# Patient Record
Sex: FEMALE | Race: WHITE | Employment: OTHER | URBAN - METROPOLITAN AREA
[De-identification: names, ages, dates, MRNs, and addresses within clinical notes are randomized per-mention and may not be internally consistent; named-entity substitution may affect disease eponyms.]

---

## 2023-12-15 NOTE — CONFIDENTIAL NOTE
DIAGNOSIS:  Kidney failure   DATE RECEIVED:  01.11.2024    NOTES STATUS DETAILS   OFFICE NOTE from referring provider     OFFICE NOTE from other specialist  Care Everywhere 09.15.2022 Wendy Snow MD  [facility]     *Only VASCULITIS or LUPUS gather office notes for the following     *PULMONARY       *ENT     *DERMATOLOGY     *RHEUMATOLOGY     DISCHARGE SUMMARY from hospital     DISCHARGE REPORT from the ER     MEDICATION LIST Care Everywhere    IMAGING  (NEED IMAGES AND REPORTS)     KIDNEY CT SCAN     KIDNEY ULTRASOUND     MR ABDOMEN     NUCLEAR MEDICINE RENAL     LABS     CBC Care Everywhere 09.21.2023   CMP Care Everywhere 09.21.2023   BMP Care Everywhere 09.28.2023    UA Care Everywhere 09.09.2023   URINE PROTEIN Care Everywhere 09.09.2023   RENAL PANEL Care Everywhere 09.11.2023    BIOPSY     KIDNEY BIOPSY

## 2024-01-02 ENCOUNTER — TELEPHONE (OUTPATIENT)
Dept: NEPHROLOGY | Facility: CLINIC | Age: 59
End: 2024-01-02
Payer: MEDICARE

## 2024-01-02 NOTE — TELEPHONE ENCOUNTER
Called and spoke with pt. Appointment reminder for 1/11 at 1:00 pm person with non-fasting labs at 12:00 pm.  Olivia Ann,  Nephrology Clinic Navigator  [facility]  Direct: [phone].

## 2024-01-04 DIAGNOSIS — R79.89 ELEVATED SERUM CREATININE: Primary | ICD-10-CM

## 2024-01-11 ENCOUNTER — OFFICE VISIT (OUTPATIENT)
Dept: NEPHROLOGY | Facility: CLINIC | Age: 59
End: 2024-01-11
Attending: INTERNAL MEDICINE
Payer: MEDICARE

## 2024-01-11 ENCOUNTER — LAB (OUTPATIENT)
Dept: LAB | Facility: CLINIC | Age: 59
End: 2024-01-11
Payer: MEDICARE

## 2024-01-11 ENCOUNTER — PRE VISIT (OUTPATIENT)
Dept: NEPHROLOGY | Facility: CLINIC | Age: 59
End: 2024-01-11

## 2024-01-11 VITALS
OXYGEN SATURATION: 96 % | SYSTOLIC BLOOD PRESSURE: 145 MMHG | HEART RATE: 81 BPM | DIASTOLIC BLOOD PRESSURE: 85 MMHG | WEIGHT: 198.4 LBS

## 2024-01-11 DIAGNOSIS — D63.1 ANEMIA IN STAGE 4 CHRONIC KIDNEY DISEASE (H): Primary | ICD-10-CM

## 2024-01-11 DIAGNOSIS — I12.9 CKD STAGE 4 SECONDARY TO HYPERTENSION (H): ICD-10-CM

## 2024-01-11 DIAGNOSIS — D63.1 ANEMIA IN STAGE 4 CHRONIC KIDNEY DISEASE (H): ICD-10-CM

## 2024-01-11 DIAGNOSIS — N18.30 DIABETES MELLITUS DUE TO UNDERLYING CONDITION WITH STAGE 3 CHRONIC KIDNEY DISEASE, UNSPECIFIED WHETHER LONG TERM INSULIN USE, UNSPECIFIED WHETHER STAGE 3A OR 3B CKD (H): ICD-10-CM

## 2024-01-11 DIAGNOSIS — E08.22 DIABETES MELLITUS DUE TO UNDERLYING CONDITION WITH STAGE 3 CHRONIC KIDNEY DISEASE, UNSPECIFIED WHETHER LONG TERM INSULIN USE, UNSPECIFIED WHETHER STAGE 3A OR 3B CKD (H): ICD-10-CM

## 2024-01-11 DIAGNOSIS — R79.89 ELEVATED SERUM CREATININE: ICD-10-CM

## 2024-01-11 DIAGNOSIS — N18.4 ANEMIA IN STAGE 4 CHRONIC KIDNEY DISEASE (H): Primary | ICD-10-CM

## 2024-01-11 DIAGNOSIS — N18.4 CKD STAGE 4 SECONDARY TO HYPERTENSION (H): ICD-10-CM

## 2024-01-11 DIAGNOSIS — I10 HYPERTENSION, ESSENTIAL: ICD-10-CM

## 2024-01-11 DIAGNOSIS — N18.4 ANEMIA IN STAGE 4 CHRONIC KIDNEY DISEASE (H): ICD-10-CM

## 2024-01-11 LAB
ALBUMIN MFR UR ELPH: 16.6 MG/DL
ALBUMIN SERPL BCG-MCNC: 4 G/DL (ref 3.5–5.2)
ALBUMIN UR-MCNC: 10 MG/DL
ANION GAP SERPL CALCULATED.3IONS-SCNC: 10 MMOL/L (ref 7–15)
APPEARANCE UR: CLEAR
BILIRUB UR QL STRIP: NEGATIVE
BUN SERPL-MCNC: 28.8 MG/DL (ref 8–23)
CALCIUM SERPL-MCNC: 9.3 MG/DL (ref 8.8–10.2)
CHLORIDE SERPL-SCNC: 104 MMOL/L (ref 98–107)
COLOR UR AUTO: ABNORMAL
CREAT SERPL-MCNC: 2.21 MG/DL (ref 0.51–0.95)
CREAT UR-MCNC: 57.4 MG/DL
DEPRECATED HCO3 PLAS-SCNC: 28 MMOL/L (ref 22–29)
EGFRCR SERPLBLD CKD-EPI 2021: 25 ML/MIN/1.73M2
ERYTHROCYTE [DISTWIDTH] IN BLOOD BY AUTOMATED COUNT: 12.6 % (ref 10–15)
FERRITIN SERPL-MCNC: 83 NG/ML (ref 11–328)
GLUCOSE SERPL-MCNC: 164 MG/DL (ref 70–99)
GLUCOSE UR STRIP-MCNC: NEGATIVE MG/DL
HCT VFR BLD AUTO: 27.4 % (ref 35–47)
HGB BLD-MCNC: 8.7 G/DL (ref 11.7–15.7)
HGB UR QL STRIP: NEGATIVE
IRON BINDING CAPACITY (ROCHE): 221 UG/DL (ref 240–430)
IRON SATN MFR SERPL: 21 % (ref 15–46)
IRON SERPL-MCNC: 46 UG/DL (ref 37–145)
KETONES UR STRIP-MCNC: NEGATIVE MG/DL
LEUKOCYTE ESTERASE UR QL STRIP: NEGATIVE
MCH RBC QN AUTO: 30.5 PG (ref 26.5–33)
MCHC RBC AUTO-ENTMCNC: 31.8 G/DL (ref 31.5–36.5)
MCV RBC AUTO: 96 FL (ref 78–100)
NITRATE UR QL: NEGATIVE
PH UR STRIP: 5 [PH] (ref 5–7)
PHOSPHATE SERPL-MCNC: 4.7 MG/DL (ref 2.5–4.5)
PLATELET # BLD AUTO: 144 10E3/UL (ref 150–450)
POTASSIUM SERPL-SCNC: 4 MMOL/L (ref 3.4–5.3)
PROT/CREAT 24H UR: 0.29 MG/MG CR (ref 0–0.2)
RBC # BLD AUTO: 2.85 10E6/UL (ref 3.8–5.2)
RBC URINE: 2 /HPF
SODIUM SERPL-SCNC: 142 MMOL/L (ref 135–145)
SP GR UR STRIP: 1.01 (ref 1–1.03)
SQUAMOUS EPITHELIAL: 5 /HPF
UROBILINOGEN UR STRIP-MCNC: NORMAL MG/DL
WBC # BLD AUTO: 5.6 10E3/UL (ref 4–11)
WBC URINE: <1 /HPF

## 2024-01-11 PROCEDURE — 82570 ASSAY OF URINE CREATININE: CPT | Performed by: INTERNAL MEDICINE

## 2024-01-11 PROCEDURE — 83036 HEMOGLOBIN GLYCOSYLATED A1C: CPT | Performed by: INTERNAL MEDICINE

## 2024-01-11 PROCEDURE — 36415 COLL VENOUS BLD VENIPUNCTURE: CPT | Performed by: PATHOLOGY

## 2024-01-11 PROCEDURE — G0463 HOSPITAL OUTPT CLINIC VISIT: HCPCS | Performed by: INTERNAL MEDICINE

## 2024-01-11 PROCEDURE — 84156 ASSAY OF PROTEIN URINE: CPT | Performed by: PATHOLOGY

## 2024-01-11 PROCEDURE — 81001 URINALYSIS AUTO W/SCOPE: CPT | Performed by: PATHOLOGY

## 2024-01-11 PROCEDURE — 85027 COMPLETE CBC AUTOMATED: CPT | Performed by: PATHOLOGY

## 2024-01-11 PROCEDURE — 80069 RENAL FUNCTION PANEL: CPT | Performed by: PATHOLOGY

## 2024-01-11 PROCEDURE — 99000 SPECIMEN HANDLING OFFICE-LAB: CPT | Performed by: PATHOLOGY

## 2024-01-11 PROCEDURE — 82728 ASSAY OF FERRITIN: CPT | Performed by: PATHOLOGY

## 2024-01-11 PROCEDURE — 83540 ASSAY OF IRON: CPT | Performed by: PATHOLOGY

## 2024-01-11 PROCEDURE — 99204 OFFICE O/P NEW MOD 45 MIN: CPT | Performed by: INTERNAL MEDICINE

## 2024-01-11 PROCEDURE — 83550 IRON BINDING TEST: CPT | Performed by: PATHOLOGY

## 2024-01-11 RX ORDER — CARVEDILOL 12.5 MG/1
12.5 TABLET ORAL 2 TIMES DAILY WITH MEALS
COMMUNITY
Start: 2023-11-29

## 2024-01-11 RX ORDER — FLUCONAZOLE 150 MG/1
TABLET ORAL
COMMUNITY
End: 2024-07-22

## 2024-01-11 RX ORDER — HYDROMORPHONE HYDROCHLORIDE 2 MG/1
2-6 TABLET ORAL
COMMUNITY
Start: 2023-09-18 | End: 2024-04-09

## 2024-01-11 RX ORDER — PHENOL 1.4 %
10 AEROSOL, SPRAY (ML) MUCOUS MEMBRANE AT BEDTIME
COMMUNITY
Start: 2023-10-03

## 2024-01-11 RX ORDER — OLOPATADINE HYDROCHLORIDE 2 MG/ML
1 SOLUTION/ DROPS OPHTHALMIC 2 TIMES DAILY PRN
COMMUNITY
Start: 2023-07-25

## 2024-01-11 RX ORDER — ASPIRIN 81 MG/1
81 TABLET, CHEWABLE ORAL DAILY
COMMUNITY

## 2024-01-11 RX ORDER — MINERAL OIL AND WHITE PETROLATUM 30; 940 MG/G; MG/G
OINTMENT OPHTHALMIC
COMMUNITY
Start: 2023-07-25

## 2024-01-11 RX ORDER — HYDROXYZINE HYDROCHLORIDE 25 MG/1
TABLET, FILM COATED ORAL
COMMUNITY
Start: 2024-01-09

## 2024-01-11 RX ORDER — ISOSORBIDE MONONITRATE 30 MG/1
30 TABLET, EXTENDED RELEASE ORAL DAILY
COMMUNITY
Start: 2023-11-29

## 2024-01-11 RX ORDER — AMOXICILLIN 250 MG
2 CAPSULE ORAL DAILY
COMMUNITY
Start: 2023-09-17

## 2024-01-11 RX ORDER — NALOXONE HYDROCHLORIDE 4 MG/.1ML
1 SPRAY NASAL
COMMUNITY
Start: 2023-07-27

## 2024-01-11 RX ORDER — ROSUVASTATIN CALCIUM 10 MG/1
1 TABLET, COATED ORAL DAILY
COMMUNITY
Start: 2023-11-29

## 2024-01-11 RX ORDER — NYSTATIN 100000/ML
SUSPENSION, ORAL (FINAL DOSE FORM) ORAL
COMMUNITY
Start: 2023-10-12

## 2024-01-11 RX ORDER — LISINOPRIL 5 MG/1
1 TABLET ORAL
COMMUNITY
Start: 2023-06-01 | End: 2024-02-09 | Stop reason: SINTOL

## 2024-01-11 RX ORDER — DM/P-EPHED/ACETAMINOPH/DOXYLAM
5000 LIQUID (ML) ORAL DAILY
COMMUNITY
End: 2024-10-07

## 2024-01-11 NOTE — PATIENT INSTRUCTIONS
It was a pleasure taking care of you today.  I've included a brief summary of our discussion and care plan from today's visit below.  Please review this information with your primary care provider.     My recommendations are summarized as follows:  -Will refer you to the anemia clinic  -Will refer you to the kidney journey  -Please check your blood pressure twice daily and report to the clinic in 1 week. After which, will decide on the lisinopril.    Who do I call with any questions after my visit?  Please be in touch if there are any further questions that arise following today's visit.  There are multiple ways to contact your nephrology care team.       During business hours, you may reach your Nephrology Care Team or schedule or reschedule an appointment or lab at [phone].       If you need to schedule imaging, please call [phone].   To schedule a COVID test, please call [phone].     You can always send a secure message through AorTx. AorTx messages are answered by your nurse or doctor typically within 24-48 hours. Please allow extra time on weekends and holidays.       For urgent/emergent questions after business hours, you may reach the on-call Nephrology Fellow by contacting the [facility]  at [phone].     How will I get the results of any tests ordered?    You will receive all of your results.  If you have signed up for AorTx, any tests ordered at your visit will be available to you once resulted on [facility] [place]. Typically the physician reviews them and may or may not make further recommendations. If there are urgent results that require a change in your care plan, your physician or nurse will call you to discuss the next steps. If you are not on [facility] [place], a letter may be generated and mailed to you with your results.
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
## 2024-01-12 ENCOUNTER — PATIENT OUTREACH (OUTPATIENT)
Dept: CARE COORDINATION | Facility: CLINIC | Age: 59
End: 2024-01-12
Payer: MEDICARE

## 2024-01-12 DIAGNOSIS — N18.4 CKD (CHRONIC KIDNEY DISEASE) STAGE 4, GFR 15-29 ML/MIN (H): ICD-10-CM

## 2024-01-12 DIAGNOSIS — D63.1 ANEMIA OF CHRONIC RENAL FAILURE, STAGE 4 (SEVERE) (H): Primary | ICD-10-CM

## 2024-01-12 DIAGNOSIS — N18.4 ANEMIA OF CHRONIC RENAL FAILURE, STAGE 4 (SEVERE) (H): Primary | ICD-10-CM

## 2024-01-12 LAB
CREAT UR-MCNC: 59.2 MG/DL
MICROALBUMIN UR-MCNC: 28.2 MG/L
MICROALBUMIN/CREAT UR: 47.64 MG/G CR (ref 0–25)

## 2024-01-12 RX ORDER — METHYLPREDNISOLONE SODIUM SUCCINATE 125 MG/2ML
125 INJECTION, POWDER, LYOPHILIZED, FOR SOLUTION INTRAMUSCULAR; INTRAVENOUS
Status: CANCELLED
Start: 2024-01-12

## 2024-01-12 RX ORDER — HEPARIN SODIUM (PORCINE) LOCK FLUSH IV SOLN 100 UNIT/ML 100 UNIT/ML
5 SOLUTION INTRAVENOUS
Status: CANCELLED | OUTPATIENT
Start: 2024-01-12

## 2024-01-12 RX ORDER — MEPERIDINE HYDROCHLORIDE 25 MG/ML
25 INJECTION INTRAMUSCULAR; INTRAVENOUS; SUBCUTANEOUS EVERY 30 MIN PRN
Status: CANCELLED | OUTPATIENT
Start: 2024-01-12

## 2024-01-12 RX ORDER — ALBUTEROL SULFATE 0.83 MG/ML
2.5 SOLUTION RESPIRATORY (INHALATION)
Status: CANCELLED | OUTPATIENT
Start: 2024-01-12

## 2024-01-12 RX ORDER — ALBUTEROL SULFATE 90 UG/1
1-2 AEROSOL, METERED RESPIRATORY (INHALATION)
Status: CANCELLED
Start: 2024-01-12

## 2024-01-12 RX ORDER — HEPARIN SODIUM,PORCINE 10 UNIT/ML
5-20 VIAL (ML) INTRAVENOUS DAILY PRN
Status: CANCELLED | OUTPATIENT
Start: 2024-01-12

## 2024-01-12 RX ORDER — DIPHENHYDRAMINE HYDROCHLORIDE 50 MG/ML
50 INJECTION INTRAMUSCULAR; INTRAVENOUS
Status: CANCELLED
Start: 2024-01-12

## 2024-01-12 RX ORDER — EPINEPHRINE 1 MG/ML
0.3 INJECTION, SOLUTION, CONCENTRATE INTRAVENOUS EVERY 5 MIN PRN
Status: CANCELLED | OUTPATIENT
Start: 2024-01-12

## 2024-01-12 NOTE — PROGRESS NOTES
Anemia Management Note - Enrollment  SUBJECTIVE/OBJECTIVE:    Referred by Dr. Nathan Blood on 2024  Primary Diagnosis: Anemia in Chronic Kidney Disease (N18.4, D63.1)     Secondary Diagnosis:  Chronic Kidney Disease, Stage 4 (N18.4)   Hgb goal range:  9-10  Epo/Darbo: TBD;  Treat with IV Iron first.  Low Iron levels.   Iron regimen:  Treat with IV Iron.    *ok to stop oral Iron.  No improvement in Iron levels with Oral Iron.   Labs : 2025  Recent BRANDON use, transfusion, IV iron: NA  RX/TX plans : TBD    *staying in [place].     No history of stroke, MI, and blood clots or cancers.    Contact:  No Consent to Communicate on File.         Latest Ref Rng & Units 2024    12:32 PM   Anemia   Hemoglobin 11.7 - 15.7 g/dL 8.7    Ferritin 11 - 328 ng/mL 83        BP Readings from Last 3 Encounters:   24 (!) 145/85   16 147/82   16 138/58     Wt Readings from Last 2 Encounters:   24 90 kg (198 lb 6.4 oz)     Current Outpatient Medications   Medication Sig Dispense Refill    Acetaminophen (TYLENOL PO) Take 1,000 mg by mouth every 8 hours as needed for mild pain or fever      albuterol (2.5 MG/3ML) 0.083% nebulizer solution Take 1 vial (2.5 mg) by nebulization every 6 hours as needed for shortness of breath / dyspnea or wheezing 1 Box 1    aspirin (ASA) 81 MG chewable tablet Take 81 mg by mouth      carvedilol (COREG) 12.5 MG tablet Take 12.5 mg by mouth 2 times daily (with meals)      Cetirizine HCl (ZYRTEC ALLERGY PO) Take 10 mg by mouth every evening       Cyclobenzaprine HCl (FLEXERIL PO) Take 10 mg by mouth 3 times daily as needed for muscle spasms      D 5000 125 MCG (5000 UT) CAPS Take 5,000 Units by mouth      Estradiol (VIVELLE-DOT TD) Place onto the skin twice a week On  and Thursday      ferrous fumarate 65 mg, [place]. FE,-Vitamin C 125 mg (VITRON C)  MG TABS tablet Take 1 tablet by mouth daily      fluconazole (DIFLUCAN) 150 MG tablet TAKE 1 TABLET  BY MOUTH ONCE EVERY 3 DAYS WHILE ON ANTIBIOTICS      HYDROmorphone (DILAUDID) 2 MG tablet Take 2-6 mg by mouth      hydrOXYzine HCl (ATARAX) 25 MG tablet 1 tablet as needed Orally three times daily      IBUPROFEN PO Take 800 mg by mouth every 8 hours as needed for moderate pain (Patient not taking: Reported on 1/11/2024)      isosorbide mononitrate (IMDUR) 30 MG 24 hr tablet Take 30 mg by mouth      lisinopril (ZESTRIL) 5 MG tablet Take 1 tablet by mouth daily at 2 pm      LORAZEPAM PO Take 1 mg by mouth 2 times daily as needed for anxiety      Melatonin 10 MG TABS tablet Take 10 mg by mouth at bedtime      METOPROLOL SUCCINATE ER PO Take 100 mg by mouth every evening  (Patient not taking: Reported on 1/11/2024)      Montelukast Sodium (SINGULAIR PO) Take 10 mg by mouth At Bedtime      NARCAN 4 MG/0.1ML nasal spray Spray 1 spray in nostril      nystatin (MYCOSTATIN) [id number] UNIT/ML suspension  ML OF NYSTATIN MIXED WITH LIDOCAINE FOR A TOTAL  ML, SWISH AND SPIT 5 ML UP TO 6 TIMES DAILY      olopatadine (PATADAY) 0.2 % ophthalmic solution Apply 1 drop to eye      OXYCODONE HCL PO Take 5 mg by mouth every 6 hours as needed (Patient not taking: Reported on 1/11/2024)      PARoxetine HCl (PAXIL PO) Take 30 mg by mouth every evening       rosuvastatin (CRESTOR) 10 MG tablet Take 1 tablet by mouth daily      senna-docusate (SENOKOT-S/PERICOLACE) 8.6-50 MG tablet Take 2 tablets by mouth      White Petrolatum-Mineral Oil (SYSTANE NIGHTTIME) OINT APPLY A SMALL AMOUNT INTO BOTH EYES AT BEDTIME       ASSESSMENT:  Hgb Not at goal/Initiation of therapy   Ferritin: Not at goal of (>100ng/mL)  TSat: not at goal of >30%  Iron regimen recommended: Treat with IV Iron.   Recommended BRANDON regimen: TBD;  Treat with IV Iron first.  Low Iron levels.   Blood Pressure: Stable    PLAN:  1. Patient called today for enrollment in Anemia Management Services  2. Discussed:  anemia overview, monitoring service, and goal hemoglobin range  and rationale and risks of BRANDON blood clots, stroke, and increase in blood pressure  3. Dose location: in clinic-[facility].   4. Labs: [place]  5. Pharmacy: NA        New Anemia Referral. Low TSAT and Ferritin is <100.  Will need to treat with IV Iron prior to initiating BRANDON.     1/12/24; Spoke with Evelyn,  she would like to get the Iron Infusions at [facility]. She is staying [place].  Evelyn has been taking oral Iron for over a month with no improvement.  Ok to stop taking the oral iron once she initiates Iron Infusions. Evelyn had question regarding which Paxil dose she should be taking, Message sent to Dr. Blood. Message sent to the [facility] to call pt and schedule Injectafer.     Next call date:  1/18/24    rAchana Salamanca RN   Anemia Services  [facility]  [email or web address]   Office : [phone]  Fax: [phone]

## 2024-01-15 LAB — HBA1C MFR BLD: 5.6 %

## 2024-01-18 ENCOUNTER — TELEPHONE (OUTPATIENT)
Dept: NEPHROLOGY | Facility: CLINIC | Age: 59
End: 2024-01-18
Payer: MEDICARE

## 2024-01-18 NOTE — TELEPHONE ENCOUNTER
[facility] Call Center    Phone Message    May a detailed message be left on voicemail: yes     Reason for Call: Other:   Patient is calling in with BP readings.   1.13 165/ 86   1.14 159/84   1.15 no reading  1.16 170/83   1.17 128/75 and   1.18 163/89 148/79   Please call back if needed, she thinks she may need to start her medication again  Action Taken: Message routed to:  Other: neph    Travel Screening: Not Applicable

## 2024-01-19 DIAGNOSIS — I10 HYPERTENSION, ESSENTIAL: Primary | ICD-10-CM

## 2024-01-19 PROBLEM — I12.9 CKD STAGE 4 SECONDARY TO HYPERTENSION (H): Status: ACTIVE | Noted: 2024-01-19

## 2024-01-19 PROBLEM — E08.22: Status: ACTIVE | Noted: 2024-01-19

## 2024-01-19 PROBLEM — N18.4 CKD STAGE 4 SECONDARY TO HYPERTENSION (H): Status: ACTIVE | Noted: 2024-01-19

## 2024-01-19 PROBLEM — N18.30: Status: ACTIVE | Noted: 2024-01-19

## 2024-01-19 RX ORDER — AMLODIPINE BESYLATE 5 MG/1
5 TABLET ORAL DAILY
Qty: 90 TABLET | Refills: 3 | Status: SHIPPED | OUTPATIENT
Start: 2024-01-19 | End: 2024-01-19

## 2024-01-19 RX ORDER — LISINOPRIL 5 MG/1
5 TABLET ORAL DAILY
Qty: 30 TABLET | Refills: 4 | Status: SHIPPED | OUTPATIENT
Start: 2024-01-19 | End: 2024-02-09 | Stop reason: SINTOL

## 2024-01-19 NOTE — TELEPHONE ENCOUNTER
Spoke with Evelyn regarding lisinopril. She was told not to take it so she has not restarted it.  Writer talked to her about starting the amlodipine. Evelyn states she did not do well with amlodipine in past and had to stop it due to edema so does not to start it.  She also had questions about her paxil. It was decreased by her PCP to 25mg daily (used to be 50mg) after her hospitalization in September due to her decreased kidney function. She was told to talk to Dr. Blood about if it can go back up again to 50mg with her current kidney function.      Lastly, writer introduced self and role as her nephrology RNCC.  Offered education and she was very interested. Will start with Kidney Smart class and potentially take Dr. August's class later.

## 2024-01-22 ENCOUNTER — PATIENT OUTREACH (OUTPATIENT)
Dept: NEPHROLOGY | Facility: CLINIC | Age: 59
End: 2024-01-22
Payer: MEDICARE

## 2024-01-23 ENCOUNTER — INFUSION THERAPY VISIT (OUTPATIENT)
Dept: INFUSION THERAPY | Facility: CLINIC | Age: 59
End: 2024-01-23
Attending: INTERNAL MEDICINE
Payer: MEDICARE

## 2024-01-23 ENCOUNTER — PATIENT OUTREACH (OUTPATIENT)
Dept: CARE COORDINATION | Facility: CLINIC | Age: 59
End: 2024-01-23
Payer: MEDICARE

## 2024-01-23 VITALS
DIASTOLIC BLOOD PRESSURE: 83 MMHG | TEMPERATURE: 97.7 F | OXYGEN SATURATION: 97 % | SYSTOLIC BLOOD PRESSURE: 146 MMHG | HEART RATE: 70 BPM | RESPIRATION RATE: 16 BRPM

## 2024-01-23 DIAGNOSIS — N18.4 ANEMIA OF CHRONIC RENAL FAILURE, STAGE 4 (SEVERE) (H): Primary | ICD-10-CM

## 2024-01-23 DIAGNOSIS — D63.1 ANEMIA OF CHRONIC RENAL FAILURE, STAGE 4 (SEVERE) (H): Primary | ICD-10-CM

## 2024-01-23 PROCEDURE — 96365 THER/PROPH/DIAG IV INF INIT: CPT

## 2024-01-23 PROCEDURE — 250N000011 HC RX IP 250 OP 636: Mod: JZ | Performed by: INTERNAL MEDICINE

## 2024-01-23 PROCEDURE — 99207 PR NO CHARGE LOS: CPT

## 2024-01-23 PROCEDURE — 258N000003 HC RX IP 258 OP 636: Performed by: INTERNAL MEDICINE

## 2024-01-23 RX ORDER — METHYLPREDNISOLONE SODIUM SUCCINATE 125 MG/2ML
125 INJECTION, POWDER, LYOPHILIZED, FOR SOLUTION INTRAMUSCULAR; INTRAVENOUS
Status: CANCELLED
Start: 2024-01-30

## 2024-01-23 RX ORDER — ALBUTEROL SULFATE 0.83 MG/ML
2.5 SOLUTION RESPIRATORY (INHALATION)
Status: CANCELLED | OUTPATIENT
Start: 2024-01-30

## 2024-01-23 RX ORDER — HEPARIN SODIUM,PORCINE 10 UNIT/ML
5-20 VIAL (ML) INTRAVENOUS DAILY PRN
Status: CANCELLED | OUTPATIENT
Start: 2024-01-30

## 2024-01-23 RX ORDER — HEPARIN SODIUM (PORCINE) LOCK FLUSH IV SOLN 100 UNIT/ML 100 UNIT/ML
5 SOLUTION INTRAVENOUS
Status: CANCELLED | OUTPATIENT
Start: 2024-01-30

## 2024-01-23 RX ORDER — DIPHENHYDRAMINE HYDROCHLORIDE 50 MG/ML
50 INJECTION INTRAMUSCULAR; INTRAVENOUS
Status: CANCELLED
Start: 2024-01-30

## 2024-01-23 RX ORDER — EPINEPHRINE 1 MG/ML
0.3 INJECTION, SOLUTION INTRAMUSCULAR; SUBCUTANEOUS EVERY 5 MIN PRN
Status: CANCELLED | OUTPATIENT
Start: 2024-01-30

## 2024-01-23 RX ORDER — MEPERIDINE HYDROCHLORIDE 25 MG/ML
25 INJECTION INTRAMUSCULAR; INTRAVENOUS; SUBCUTANEOUS EVERY 30 MIN PRN
Status: CANCELLED | OUTPATIENT
Start: 2024-01-30

## 2024-01-23 RX ORDER — ALBUTEROL SULFATE 90 UG/1
1-2 AEROSOL, METERED RESPIRATORY (INHALATION)
Status: CANCELLED
Start: 2024-01-30

## 2024-01-23 RX ADMIN — FERRIC CARBOXYMALTOSE INJECTION 750 MG: 50 INJECTION, SOLUTION INTRAVENOUS at 08:46

## 2024-01-23 RX ADMIN — SODIUM CHLORIDE 250 ML: 9 INJECTION, SOLUTION INTRAVENOUS at 08:45

## 2024-01-23 ASSESSMENT — PAIN SCALES - GENERAL: PAINLEVEL: MODERATE PAIN (5)

## 2024-01-23 NOTE — PROGRESS NOTES
Infusion Nursing Note:  Evelyn Cisneros presents today for injectafer #1/2.    Patient seen by provider today: No   present during visit today: Not Applicable.    Note: Written injectafer information given and reviewed verbally. Also attached information into patient instructions.      Intravenous Access:  Peripheral IV placed.    Treatment Conditions:  See system review flow sheet.       Post Infusion Assessment:  Patient tolerated infusion without incident.  Patient observed 30 minutes post injectafer without incident.  Blood return noted pre and post infusion.  No evidence of extravasations.  Access discontinued per protocol.       Discharge Plan:   Patient discharged in stable condition accompanied by: friend.  Departure Mode: Ambulatory and using her walker.  Verbally reviewed second injectafer dose 1/30/24.      Barb Gonzalez RN

## 2024-01-23 NOTE — PROGRESS NOTES
Anemia Management Note  SUBJECTIVE/OBJECTIVE:  Referred by Dr. Nathan Blood on 2024  Primary Diagnosis: Anemia in Chronic Kidney Disease (N18.4, D63.1)     Secondary Diagnosis:  Chronic Kidney Disease, Stage 4 (N18.4)   Hgb goal range:  9-10  Epo/Darbo: TBD;  Treat with IV Iron first.  Low Iron levels.   Iron regimen:  Treat with IV Iron.    *ok to stop oral Iron.  No improvement in Iron levels with Oral Iron.   Labs : 2025  Recent BRANDON use, transfusion, IV iron: NA  RX/TX plans : TBD     *staying in [place].      No history of stroke, MI, and blood clots or cancers.     Contact:            No Consent to Communicate on File.            Latest Ref Rng & Units 2024    12:32 PM 2024     9:00 AM   Anemia   Hemoglobin 11.7 - 15.7 g/dL 8.7     IV Iron Dose   750mg   Ferritin 11 - 328 ng/mL 83       BP Readings from Last 3 Encounters:   24 (!) 146/83   24 (!) 145/85   16 147/82     Wt Readings from Last 2 Encounters:   24 90 kg (198 lb 6.4 oz)           ASSESSMENT:  Hgb:Not at goal/Known  TSat: Due for iron studies 4 weeks after last IV iron infusion Ferritin: Due for iron studies 4 weeks after last IV iron infusion    PLAN:  2nd Injectafer scheduled for 24.     Orders needed to be renewed (for next follow-up date) in EPIC: None    Iron labs due:  4 weeks after last IV iron infusion    Plan discussed with:  No call, chart review       NEXT FOLLOW-UP DATE:  24    Archana Salamanca RN   Anemia Services  [facility]  [email or web address]   Office : [phone]  Fax: [phone]

## 2024-01-29 ENCOUNTER — TELEPHONE (OUTPATIENT)
Dept: NEPHROLOGY | Facility: CLINIC | Age: 59
End: 2024-01-29
Payer: MEDICARE

## 2024-01-29 NOTE — TELEPHONE ENCOUNTER
Patient called [facility] with questions regarding side effects of Injection. She reports receiving her first Injectafer infusion on Tuesday, 1/23/24. The patient reports joint pain, muscle pain, abd pain, low grade fevers, and fatigue. She notes on Wednesday, Thursday, Friday, and Saturday being her worst days with a slight improvement on Sunday and Monday. The patient also notes she was diagnosed with a bladder infection on Monday, 1/22/24, and was started on antibiotics. She notes she only completed 4 days of her 7 day antibiotics treatment.     Writer encouraged the patient to call her Nephrologist, the provider who prescribed her Injectafer infusions, and seek guidance as to if she should get her next Injectafer infusion and guidance regarding not completing her antiobio. Patient verbalized understanding and stated that she had the number to her Nephrologist nurse line and would call for further instructions.     Patient instructed to go to the ED and be seen right away if her symptoms persisted or worsened. The patient verbalized understanding of this as well.

## 2024-01-29 NOTE — TELEPHONE ENCOUNTER
"Received a call from Evelyn. She was asking if she should do her 2nd injectafer infusion tomorrow as after her first one she had some \"flu-like symptoms\"- achy, maybe a fever off and on, stomach upset, lack of appetite, etc. She also relayed that she was just started on cipro for a UTI at that same time. Writer told her that her symptoms sound more like a viral thing or having trouble tolerating the antibiotic. She did end up only doing partial antibiotic treatment- 4 of the 7 days.  Writer checked with anemia nurse, Rohini, who said she should be fine to get second infusion tomorrow. Evelyn also talked about her blood sugars being more elevated. States she was taken off metformin due to bump in her crt and hadn't been put back on anything. States her BS has been between 160-243.  She asked what would be a medication that nephrology would be ok with her to be on for her DM.      ====================================================  Writer spoke with Dr. August who said:  \"her PCP will know what is safe for her GFR, typically glipizide or januvia or the GLP-a (emaglutide), I dont think her symptoms are related to iron- those happen during infusion and are different\".      ==================================================    Writer left a message with Evelyn asking her to talk to her PCP- BRYAN Duggan about the diabetic agent as well as the continued UTI symptoms and GI upset with antibiotic.    "

## 2024-01-30 ENCOUNTER — INFUSION THERAPY VISIT (OUTPATIENT)
Dept: INFUSION THERAPY | Facility: CLINIC | Age: 59
End: 2024-01-30
Attending: INTERNAL MEDICINE
Payer: MEDICARE

## 2024-01-30 ENCOUNTER — PATIENT OUTREACH (OUTPATIENT)
Dept: CARE COORDINATION | Facility: CLINIC | Age: 59
End: 2024-01-30

## 2024-01-30 VITALS
SYSTOLIC BLOOD PRESSURE: 107 MMHG | WEIGHT: 191.4 LBS | OXYGEN SATURATION: 97 % | TEMPERATURE: 97.7 F | DIASTOLIC BLOOD PRESSURE: 68 MMHG | RESPIRATION RATE: 16 BRPM | HEART RATE: 69 BPM

## 2024-01-30 DIAGNOSIS — N18.4 ANEMIA OF CHRONIC RENAL FAILURE, STAGE 4 (SEVERE) (H): Primary | ICD-10-CM

## 2024-01-30 DIAGNOSIS — D63.1 ANEMIA OF CHRONIC RENAL FAILURE, STAGE 4 (SEVERE) (H): Primary | ICD-10-CM

## 2024-01-30 PROCEDURE — 258N000003 HC RX IP 258 OP 636: Performed by: INTERNAL MEDICINE

## 2024-01-30 PROCEDURE — 99207 PR NO CHARGE LOS: CPT

## 2024-01-30 PROCEDURE — 96365 THER/PROPH/DIAG IV INF INIT: CPT

## 2024-01-30 PROCEDURE — 250N000011 HC RX IP 250 OP 636: Mod: JZ | Performed by: INTERNAL MEDICINE

## 2024-01-30 RX ORDER — HEPARIN SODIUM (PORCINE) LOCK FLUSH IV SOLN 100 UNIT/ML 100 UNIT/ML
5 SOLUTION INTRAVENOUS
OUTPATIENT
Start: 2024-01-30

## 2024-01-30 RX ORDER — MEPERIDINE HYDROCHLORIDE 25 MG/ML
25 INJECTION INTRAMUSCULAR; INTRAVENOUS; SUBCUTANEOUS EVERY 30 MIN PRN
OUTPATIENT
Start: 2024-01-30

## 2024-01-30 RX ORDER — EPINEPHRINE 1 MG/ML
0.3 INJECTION, SOLUTION INTRAMUSCULAR; SUBCUTANEOUS EVERY 5 MIN PRN
OUTPATIENT
Start: 2024-01-30

## 2024-01-30 RX ORDER — METHYLPREDNISOLONE SODIUM SUCCINATE 125 MG/2ML
125 INJECTION, POWDER, LYOPHILIZED, FOR SOLUTION INTRAMUSCULAR; INTRAVENOUS
Start: 2024-01-30

## 2024-01-30 RX ORDER — DIPHENHYDRAMINE HYDROCHLORIDE 50 MG/ML
50 INJECTION INTRAMUSCULAR; INTRAVENOUS
Start: 2024-01-30

## 2024-01-30 RX ORDER — HEPARIN SODIUM,PORCINE 10 UNIT/ML
5-20 VIAL (ML) INTRAVENOUS DAILY PRN
OUTPATIENT
Start: 2024-01-30

## 2024-01-30 RX ORDER — ALBUTEROL SULFATE 90 UG/1
1-2 AEROSOL, METERED RESPIRATORY (INHALATION)
Start: 2024-01-30

## 2024-01-30 RX ORDER — ALBUTEROL SULFATE 0.83 MG/ML
2.5 SOLUTION RESPIRATORY (INHALATION)
OUTPATIENT
Start: 2024-01-30

## 2024-01-30 RX ADMIN — FERRIC CARBOXYMALTOSE INJECTION 750 MG: 50 INJECTION, SOLUTION INTRAVENOUS at 11:28

## 2024-01-30 RX ADMIN — SODIUM CHLORIDE 250 ML: 9 INJECTION, SOLUTION INTRAVENOUS at 11:24

## 2024-01-30 ASSESSMENT — PAIN SCALES - GENERAL: PAINLEVEL: MODERATE PAIN (5)

## 2024-01-30 NOTE — PROGRESS NOTES
Anemia Management Note - Follow Up      SUBJECTIVE/OBJECTIVE:    Referred by Dr. Nathan Blood on 2024  Primary Diagnosis: Anemia in Chronic Kidney Disease (N18.4, D63.1)     Secondary Diagnosis:  Chronic Kidney Disease, Stage 4 (N18.4)   Hgb goal range:  9-10  Epo/Darbo: TBD;  Treat with IV Iron first.  Low Iron levels.   Iron regimen:  Treat with IV Iron.    *ok to stop oral Iron.  No improvement in Iron levels with Oral Iron.   Labs : 2025  Recent BRANDON use, transfusion, IV iron: NA  RX/TX plans : TBD     *staying in [place].      No history of stroke, MI, and blood clots or cancers.     Contact:            No Consent to Communicate on File.         Latest Ref Rng & Units 2024   Anemia   Hemoglobin 11.7 - 15.7 g/dL 8.7      IV Iron Dose   750mg 750mg   Ferritin 11 - 328 ng/mL 83        BP Readings from Last 3 Encounters:   24 107/68   24 (!) 146/83   24 (!) 145/85     Wt Readings from Last 2 Encounters:   24 86.8 kg (191 lb 6.4 oz)   24 90 kg (198 lb 6.4 oz)           ASSESSMENT:    Hgb:Not at goal/Known  TSat: Due for iron studies 4 weeks after last IV iron infusion Ferritin: Due for iron studies 4 weeks after last IV iron infusion    PLAN:  Check iron studies in 4 week(s). If TSAT 20% or > ok to start BRANDON.     Orders needed to be renewed (for next follow-up date) in EPIC: None    Iron labs due:  24    Plan discussed with:  No call, chart review       NEXT FOLLOW-UP DATE:  24    Archana Salamanca RN   Anemia Services  [facility]  [email or web address]   Office : [phone]  Fax: [phone]

## 2024-01-30 NOTE — PROGRESS NOTES
Infusion Nursing Note:  Evelyn Cisneros presents today for Injectafer 2/2.    Patient seen by provider today: No   present during visit today: Not Applicable.    Note: The patient reports joint pain, muscle pain, abd pain, low grade fevers, and fatigue for several days after her first Injectafer infusion. The patient also notes she was diagnosed with a bladder infection on Monday, 1/22/24, and was started on antibiotics. She notes she only completed 4 days of her 7 day antibiotics treatment.     She reached out to Dr. Blood's team yesterday regarding the above and they stated was given the okay to proceed with her second Injectafer infusion.      The patient overall all feels her symptoms have improved and she is feeling more at her baseline today.     The patient's PCA, Fouzia, was present for visit today.    Intravenous Access:  Peripheral IV placed.    Treatment Conditions:  Not Applicable.      Post Infusion Assessment:  Patient tolerated infusion without incident.  Patient observed for 15 minutes post Injectafer per protocol.  Site patent and intact, free from redness, edema or discomfort.  No evidence of extravasations.  Access discontinued per protocol.       Discharge Plan:   [facility]  Patient will return as directed by her provider for her next appointment.  Patient discharged in stable condition accompanied by: self.  Departure Mode: Ambulatory.      Ann Cota RN

## 2024-02-06 ENCOUNTER — LAB (OUTPATIENT)
Dept: LAB | Facility: CLINIC | Age: 59
End: 2024-02-06
Payer: MEDICARE

## 2024-02-06 DIAGNOSIS — D63.1 ANEMIA OF CHRONIC RENAL FAILURE, STAGE 4 (SEVERE) (H): ICD-10-CM

## 2024-02-06 DIAGNOSIS — I12.9 CKD STAGE 4 SECONDARY TO HYPERTENSION (H): ICD-10-CM

## 2024-02-06 DIAGNOSIS — N18.4 CKD STAGE 4 SECONDARY TO HYPERTENSION (H): ICD-10-CM

## 2024-02-06 DIAGNOSIS — I10 HYPERTENSION, ESSENTIAL: ICD-10-CM

## 2024-02-06 DIAGNOSIS — N18.4 ANEMIA OF CHRONIC RENAL FAILURE, STAGE 4 (SEVERE) (H): ICD-10-CM

## 2024-02-06 DIAGNOSIS — N18.4 CKD (CHRONIC KIDNEY DISEASE) STAGE 4, GFR 15-29 ML/MIN (H): ICD-10-CM

## 2024-02-06 LAB
ALBUMIN SERPL BCG-MCNC: 4.4 G/DL (ref 3.5–5.2)
ANION GAP SERPL CALCULATED.3IONS-SCNC: 11 MMOL/L (ref 7–15)
BUN SERPL-MCNC: 39.2 MG/DL (ref 8–23)
CALCIUM SERPL-MCNC: 9.8 MG/DL (ref 8.8–10.2)
CHLORIDE SERPL-SCNC: 106 MMOL/L (ref 98–107)
CREAT SERPL-MCNC: 3.01 MG/DL (ref 0.51–0.95)
DEPRECATED HCO3 PLAS-SCNC: 23 MMOL/L (ref 22–29)
EGFRCR SERPLBLD CKD-EPI 2021: 17 ML/MIN/1.73M2
FERRITIN SERPL-MCNC: 1643 NG/ML (ref 11–328)
GLUCOSE SERPL-MCNC: 104 MG/DL (ref 70–99)
HCT VFR BLD AUTO: 29.4 % (ref 35–47)
HGB BLD-MCNC: 9.5 G/DL (ref 11.7–15.7)
IRON BINDING CAPACITY (ROCHE): 217 UG/DL (ref 240–430)
IRON SATN MFR SERPL: 65 % (ref 15–46)
IRON SERPL-MCNC: 141 UG/DL (ref 37–145)
PHOSPHATE SERPL-MCNC: 3.5 MG/DL (ref 2.5–4.5)
POTASSIUM SERPL-SCNC: 3.8 MMOL/L (ref 3.4–5.3)
PTH-INTACT SERPL-MCNC: 39 PG/ML (ref 15–65)
SODIUM SERPL-SCNC: 140 MMOL/L (ref 135–145)
TOTAL PROTEIN SERUM FOR ELP: 7.1 G/DL (ref 6.4–8.3)
VIT D+METAB SERPL-MCNC: 64 NG/ML (ref 20–50)

## 2024-02-06 PROCEDURE — 83540 ASSAY OF IRON: CPT

## 2024-02-06 PROCEDURE — 84165 PROTEIN E-PHORESIS SERUM: CPT | Performed by: STUDENT IN AN ORGANIZED HEALTH CARE EDUCATION/TRAINING PROGRAM

## 2024-02-06 PROCEDURE — 80069 RENAL FUNCTION PANEL: CPT

## 2024-02-06 PROCEDURE — 82306 VITAMIN D 25 HYDROXY: CPT

## 2024-02-06 PROCEDURE — 85018 HEMOGLOBIN: CPT

## 2024-02-06 PROCEDURE — 86334 IMMUNOFIX E-PHORESIS SERUM: CPT | Performed by: STUDENT IN AN ORGANIZED HEALTH CARE EDUCATION/TRAINING PROGRAM

## 2024-02-06 PROCEDURE — 36415 COLL VENOUS BLD VENIPUNCTURE: CPT

## 2024-02-06 PROCEDURE — 82728 ASSAY OF FERRITIN: CPT

## 2024-02-06 PROCEDURE — 83550 IRON BINDING TEST: CPT

## 2024-02-06 PROCEDURE — 84155 ASSAY OF PROTEIN SERUM: CPT

## 2024-02-06 PROCEDURE — 85014 HEMATOCRIT: CPT

## 2024-02-06 PROCEDURE — 83970 ASSAY OF PARATHORMONE: CPT

## 2024-02-06 PROCEDURE — 83521 IG LIGHT CHAINS FREE EACH: CPT

## 2024-02-07 ENCOUNTER — PATIENT OUTREACH (OUTPATIENT)
Dept: NEPHROLOGY | Facility: CLINIC | Age: 59
End: 2024-02-07
Payer: MEDICARE

## 2024-02-07 DIAGNOSIS — I10 HYPERTENSION, ESSENTIAL: Primary | ICD-10-CM

## 2024-02-07 LAB
ALBUMIN SERPL ELPH-MCNC: 4.3 G/DL (ref 3.7–5.1)
ALPHA1 GLOB SERPL ELPH-MCNC: 0.3 G/DL (ref 0.2–0.4)
ALPHA2 GLOB SERPL ELPH-MCNC: 0.7 G/DL (ref 0.5–0.9)
B-GLOBULIN SERPL ELPH-MCNC: 0.6 G/DL (ref 0.6–1)
GAMMA GLOB SERPL ELPH-MCNC: 1.3 G/DL (ref 0.7–1.6)
KAPPA LC FREE SER-MCNC: 8.13 MG/DL (ref 0.33–1.94)
KAPPA LC FREE/LAMBDA FREE SER NEPH: 2.03 {RATIO} (ref 0.26–1.65)
LAMBDA LC FREE SERPL-MCNC: 4.01 MG/DL (ref 0.57–2.63)
M PROTEIN SERPL ELPH-MCNC: 0 G/DL
PROT PATTERN SERPL ELPH-IMP: NORMAL
PROT PATTERN SERPL IFE-IMP: NORMAL

## 2024-02-07 RX ORDER — AMLODIPINE BESYLATE 5 MG/1
5 TABLET ORAL DAILY
Qty: 90 TABLET | Refills: 3 | Status: SHIPPED | OUTPATIENT
Start: 2024-02-07 | End: 2024-02-09 | Stop reason: SINTOL

## 2024-02-07 NOTE — PROGRESS NOTES
Calling to check in on Evelyn and her blood pressures since she started lisinopril a couple weeks ago.  Evelyn relays that her BP's have mostly ranged in the 110's/70's with occasionally numbers in the 140-145 range.  Her labs came back with a bump in crt from 2.21 to 3.01.  She had been feeling ill back on 1/29 and writer asked if she was feeling better now. Evelyn states her appetite is still not great but she is drinking plenty of fluids.  Will forward to Dr. Blood.    Last Comprehensive Metabolic Panel:2/6/2024  Lab Results   Component Value Date     02/06/2024    POTASSIUM 3.8 02/06/2024    CHLORIDE 106 02/06/2024    CO2 23 02/06/2024    ANIONGAP 11 02/06/2024     (H) 02/06/2024    BUN 39.2 (H) 02/06/2024    CR 3.01 (H) 02/06/2024    GFRESTIMATED 17 (L) 02/06/2024    BROOKE 9.8 02/06/2024

## 2024-02-09 RX ORDER — NIFEDIPINE 30 MG/1
30 TABLET, EXTENDED RELEASE ORAL AT BEDTIME
Qty: 30 TABLET | Refills: 11 | Status: SHIPPED | OUTPATIENT
Start: 2024-02-09 | End: 2024-02-09

## 2024-02-09 RX ORDER — NIFEDIPINE 30 MG/1
30 TABLET, EXTENDED RELEASE ORAL AT BEDTIME
Qty: 30 TABLET | Refills: 11 | Status: SHIPPED | OUTPATIENT
Start: 2024-02-09 | End: 2024-03-04 | Stop reason: SINTOL

## 2024-02-09 NOTE — PROGRESS NOTES
Spoke to Evelyn about discontinuing lisinopril and starting nifedipine. Explained that is in the same family as amlodipine but do not hear as many issues with swelling/edema. Encouraged her to take at bedtime as there may be some lightheadedness as her body gets used to it. Will check in with her in a week or two. She will call if she has any issues prior to that check in. Evelyn verbalizes understanding.

## 2024-02-12 ENCOUNTER — TELEPHONE (OUTPATIENT)
Dept: NEPHROLOGY | Facility: CLINIC | Age: 59
End: 2024-02-12
Payer: MEDICARE

## 2024-02-24 NOTE — TELEPHONE ENCOUNTER
Retail Pharmacy Prior Authorization Team   Phone: [phone]    PA Initiation    Medication: NIFEDIPINE ER OSMOTIC RELEASE 30 MG PO TB24  Insurance Company: [employer] ([facility]) - Phone [phone] Fax [phone]  Pharmacy Filling the Rx: [place] [facility] [place] - [address]   Filling Pharmacy Phone: [phone]  Filling Pharmacy Fax:    Start Date: 2/24/2024

## 2024-02-26 ENCOUNTER — PATIENT OUTREACH (OUTPATIENT)
Dept: CARE COORDINATION | Facility: CLINIC | Age: 59
End: 2024-02-26
Payer: MEDICARE

## 2024-02-26 NOTE — PROGRESS NOTES
Received a VM from Evelyn.She states her pharmacy told her she needed prior auth for her nifedipine so she has not started it as of yet as the PA has not went through. Evelyn also told writer that the pharmacy told her to keep taking the lisinopril until she gets the nifedipine.  Writer told her she should stop the lisinopril because we think the lisinopril caused her crt to bump from 2.2 to 3.0.  Writer did inform her that, per chart review, the PA was sent through by our team on 2/24 to [facility].  So we should hear something in the next day or two.  Writer will send update to Dr. Blood as Evelyn has been taking lisinopril for the  last , almost two weeks.   Unknown if ever smoked

## 2024-02-27 ENCOUNTER — TELEPHONE (OUTPATIENT)
Dept: NEPHROLOGY | Facility: CLINIC | Age: 59
End: 2024-02-27
Payer: MEDICARE

## 2024-02-27 ENCOUNTER — PATIENT OUTREACH (OUTPATIENT)
Dept: NEPHROLOGY | Facility: CLINIC | Age: 59
End: 2024-02-27
Payer: MEDICARE

## 2024-02-27 NOTE — PROGRESS NOTES
Anemia Management Note - Follow Up      SUBJECTIVE/OBJECTIVE:    Referred by Dr. Nathan Blood on 2024  Primary Diagnosis: Anemia in Chronic Kidney Disease (N18.4, D63.1)     Secondary Diagnosis:  Chronic Kidney Disease, Stage 4 (N18.4)   Hgb goal range:  9-10  Epo/Darbo: TBD;  Treat with IV Iron first.  Low Iron levels.   Iron regimen:  Treat with IV Iron.    *ok to stop oral Iron.  No improvement in Iron levels with Oral Iron.   Labs : 2025  Recent BRANDON use, transfusion, IV iron: NA  RX/TX plans : TBD     *staying in [place].      No history of stroke, MI, and blood clots or cancers.     Contact:            No Consent to Communicate on File.         Latest Ref Rng & Units 2024   Anemia   Hemoglobin 11.7 - 15.7 g/dL 8.7    9.5    IV Iron Dose   750mg 750mg    TSAT 15 - 46 % 21    65    Ferritin 11 - 328 ng/mL 83    1,643      BP Readings from Last 3 Encounters:   24 107/68   24 (!) 146/83   24 (!) 145/85     Wt Readings from Last 2 Encounters:   24 86.8 kg (191 lb 6.4 oz)   24 90 kg (198 lb 6.4 oz)           ASSESSMENT:    Hgb:at goal - continue to monitor  TSat: elevated at >50% Ferritin: Elevated (>1000ng/mL)    PLAN:  Iron levels were drawn a week after the Iron Infusions.   Levels are elevated. Recheck Iron labs and Hgb in Early 2024.     Orders needed to be renewed (for next follow-up date) in EPIC: None    Iron labs due:  Early 2024    Plan discussed with:  No call, chart review       NEXT FOLLOW-UP DATE:  3/6/24    Archana Salamanca RN   Anemia Services  [facility]  [email or web address]   Office : [phone]  Fax: [phone]

## 2024-02-27 NOTE — PROGRESS NOTES
Spoke to Evelyn to make sure she had gotten message that her nifedipine had been approved.  Evelyn did get an automated message that a medication had been approved. She will pick it up this week. Writer said to let writer know if she has any concerning side effects with the nifedipine. Did tell her that sometimes people get a little dizzy when they first start out taking this medication.  Writer will check in with her late next week to see how BP's are looking. Evelyn verbalizes understanding.

## 2024-02-27 NOTE — TELEPHONE ENCOUNTER
Prior Authorization Approval    Medication: NIFEDIPINE ER OSMOTIC RELEASE 30 MG PO TB24  Authorization Effective Date: 2/24/2024  Authorization Expiration Date: 12/31/2024  Insurance Company: [employer] ([facility]) - Phone [phone] Fax [phone]  Which Pharmacy is filling the prescription: [facility] [place] - [address]  Pharmacy Notified: YES  Patient Notified: YES (pharmacy will notify the patient when ready)

## 2024-02-27 NOTE — CONFIDENTIAL NOTE
02/27 Called patient (1st attempt) left voicemail and provided [phone] for patient to call back and confirm appointment with  being rescheduled from 04/23/2024 at 10am to 04/26/2024 at 10am with labs scheduled at 930am. Slot on hold for rescheduling.       Daniela sotelo Complex   Gastroenterology, Infectious Diseases, Nephrology, Pulmonology and Rheumatology Specialties  [facility]

## 2024-03-04 NOTE — PROGRESS NOTES
Received a call from Evelyn. She tried taking the nifedipine for 3-4 nights but had to stop as she was nauseated, had headaches, and had no energy. She did not feel good on it. Writer asked how her blood pressures are off the nifedipine and she states that they have been in the 120's/70's. Writer told her that those are good numbers. Will pass information onto Dr. Blood and update Evelyn when I hear back.

## 2024-03-06 NOTE — PROGRESS NOTES
Labs are schedule 4/4/24.     Archana Salamanca RN   Anemia Services  [facility]  [email or web address]   Office : [phone]  Fax: [phone]

## 2024-03-13 ENCOUNTER — VIRTUAL VISIT (OUTPATIENT)
Dept: NEPHROLOGY | Facility: CLINIC | Age: 59
End: 2024-03-13
Payer: MEDICARE

## 2024-03-13 DIAGNOSIS — N18.4 CKD (CHRONIC KIDNEY DISEASE) STAGE 4, GFR 15-29 ML/MIN (H): Primary | ICD-10-CM

## 2024-03-13 PROCEDURE — G0421 ED SVC CKD GRP PER SESSION: HCPCS | Mod: 95 | Performed by: INTERNAL MEDICINE

## 2024-03-28 ENCOUNTER — PATIENT OUTREACH (OUTPATIENT)
Dept: NEPHROLOGY | Facility: CLINIC | Age: 59
End: 2024-03-28
Payer: MEDICARE

## 2024-03-28 ENCOUNTER — DOCUMENTATION ONLY (OUTPATIENT)
Dept: NEPHROLOGY | Facility: CLINIC | Age: 59
End: 2024-03-28
Payer: MEDICARE

## 2024-03-28 DIAGNOSIS — N18.4 CKD STAGE 4 SECONDARY TO HYPERTENSION (H): Primary | ICD-10-CM

## 2024-03-28 DIAGNOSIS — I12.9 CKD STAGE 4 SECONDARY TO HYPERTENSION (H): Primary | ICD-10-CM

## 2024-03-28 NOTE — PROGRESS NOTES
CKD Journey updated to reflect recent [facility] Nephrology CKD course completed.       Neph Tracking Flowsheet Last Filled Values       CKD Education Status Referred    CKD Education Referral Date 01/22/24    CKD Education Completed Date 03/13/24    CKD Education Type Kidney Smart Modality Education; Kidney Smart CKD Basics  Dr. August Course Completed    Patient's Referral Dates Auto Populate Patient's Referral Dates    Anemia Services Referral 1/11/24    Journey Referral 1/11/24          Patient followed by [facility] nephrology team with next follow up at . Unsure where patient plans to follow in future. No changes made to nephrology care team.

## 2024-03-28 NOTE — PROGRESS NOTES
CKD Late Stage Group Education    Type of service:  Group Video Visit    Video Visit Start Time:  400pm  Video End Time:  500pm    Platform used for Video Visit: Platform used for Video Visit: Zoom    Visit Diagnosis: Data Unavailable    Evelyn Cisneros attended the late CKD education class. This class is designed to educate patients about general kidney health, taking care of their blood pressure and risk factors for progression of kidney disease, dietary restrictions including sodium, potassium and phosphorus, and treatment of kidney failure with transplant, peritoneal dialysis or hemodialysis, or conservative medical therapy.      Topics addressed:  Therapeutic options  Dialysis modalities  Dialysis access  Transplantation  Avoidance of blood transfusions  Comorbidities (diabetes, hypertension)  Prevention of uremic complications    - smoking cessation    - avoidance of NSAIDS    - anemia    - bone disease  Nutrition  Conservative management/Palliative care    This class is 60 minutes in length with a question and answer session at the end.

## 2024-04-04 ENCOUNTER — LAB (OUTPATIENT)
Dept: LAB | Facility: CLINIC | Age: 59
End: 2024-04-04
Payer: MEDICARE

## 2024-04-04 DIAGNOSIS — N18.4 CKD (CHRONIC KIDNEY DISEASE), STAGE IV (H): ICD-10-CM

## 2024-04-04 DIAGNOSIS — I12.9 CKD STAGE 4 SECONDARY TO HYPERTENSION (H): ICD-10-CM

## 2024-04-04 DIAGNOSIS — D63.1 ANEMIA OF CHRONIC RENAL FAILURE, STAGE 4 (SEVERE) (H): ICD-10-CM

## 2024-04-04 DIAGNOSIS — N18.4 CKD STAGE 4 SECONDARY TO HYPERTENSION (H): ICD-10-CM

## 2024-04-04 DIAGNOSIS — N18.4 ANEMIA OF CHRONIC RENAL FAILURE, STAGE 4 (SEVERE) (H): ICD-10-CM

## 2024-04-04 DIAGNOSIS — N18.4 CKD (CHRONIC KIDNEY DISEASE) STAGE 4, GFR 15-29 ML/MIN (H): ICD-10-CM

## 2024-04-04 LAB
ALBUMIN MFR UR ELPH: 16.4 MG/DL
ALBUMIN SERPL BCG-MCNC: 4.3 G/DL (ref 3.5–5.2)
ALBUMIN UR-MCNC: NEGATIVE MG/DL
ANION GAP SERPL CALCULATED.3IONS-SCNC: 12 MMOL/L (ref 7–15)
APPEARANCE UR: CLEAR
BILIRUB UR QL STRIP: NEGATIVE
BUN SERPL-MCNC: 32.9 MG/DL (ref 8–23)
CALCIUM SERPL-MCNC: 9.6 MG/DL (ref 8.8–10.2)
CHLORIDE SERPL-SCNC: 106 MMOL/L (ref 98–107)
COLOR UR AUTO: YELLOW
CREAT SERPL-MCNC: 2.32 MG/DL (ref 0.51–0.95)
CREAT UR-MCNC: 60.4 MG/DL
DEPRECATED HCO3 PLAS-SCNC: 24 MMOL/L (ref 22–29)
EGFRCR SERPLBLD CKD-EPI 2021: 23 ML/MIN/1.73M2
FERRITIN SERPL-MCNC: 776 NG/ML (ref 11–328)
GLUCOSE SERPL-MCNC: 187 MG/DL (ref 70–99)
GLUCOSE UR STRIP-MCNC: NEGATIVE MG/DL
HCT VFR BLD AUTO: 31.1 % (ref 35–47)
HGB BLD-MCNC: 10.3 G/DL (ref 11.7–15.7)
HGB UR QL STRIP: NEGATIVE
IRON BINDING CAPACITY (ROCHE): 203 UG/DL (ref 240–430)
IRON SATN MFR SERPL: 37 % (ref 15–46)
IRON SERPL-MCNC: 75 UG/DL (ref 37–145)
KETONES UR STRIP-MCNC: NEGATIVE MG/DL
LEUKOCYTE ESTERASE UR QL STRIP: NEGATIVE
NITRATE UR QL: NEGATIVE
PH UR STRIP: 5.5 [PH] (ref 5–7)
PHOSPHATE SERPL-MCNC: 4.1 MG/DL (ref 2.5–4.5)
POTASSIUM SERPL-SCNC: 3.9 MMOL/L (ref 3.4–5.3)
PROT/CREAT 24H UR: 0.27 MG/MG CR (ref 0–0.2)
PTH-INTACT SERPL-MCNC: 65 PG/ML (ref 15–65)
RBC #/AREA URNS AUTO: ABNORMAL /HPF
SODIUM SERPL-SCNC: 142 MMOL/L (ref 135–145)
SP GR UR STRIP: 1.01 (ref 1–1.03)
SQUAMOUS #/AREA URNS AUTO: ABNORMAL /LPF
UROBILINOGEN UR STRIP-ACNC: 0.2 E.U./DL
WBC #/AREA URNS AUTO: ABNORMAL /HPF

## 2024-04-04 PROCEDURE — 83540 ASSAY OF IRON: CPT

## 2024-04-04 PROCEDURE — 83550 IRON BINDING TEST: CPT

## 2024-04-04 PROCEDURE — 84156 ASSAY OF PROTEIN URINE: CPT

## 2024-04-04 PROCEDURE — 85014 HEMATOCRIT: CPT

## 2024-04-04 PROCEDURE — 85018 HEMOGLOBIN: CPT

## 2024-04-04 PROCEDURE — 36415 COLL VENOUS BLD VENIPUNCTURE: CPT

## 2024-04-04 PROCEDURE — 83970 ASSAY OF PARATHORMONE: CPT

## 2024-04-04 PROCEDURE — 80069 RENAL FUNCTION PANEL: CPT

## 2024-04-04 PROCEDURE — 81001 URINALYSIS AUTO W/SCOPE: CPT

## 2024-04-04 PROCEDURE — 82728 ASSAY OF FERRITIN: CPT

## 2024-04-05 ENCOUNTER — PATIENT OUTREACH (OUTPATIENT)
Dept: CARE COORDINATION | Facility: CLINIC | Age: 59
End: 2024-04-05
Payer: MEDICARE

## 2024-04-05 NOTE — PROGRESS NOTES
Anemia Management Note - Follow Up      SUBJECTIVE/OBJECTIVE:    Referred by Dr. Nathan Blood on 2024  Primary Diagnosis: Anemia in Chronic Kidney Disease (N18.4, D63.1)     Secondary Diagnosis:  Chronic Kidney Disease, Stage 4 (N18.4)   Hgb goal range:  9-10  Epo/Darbo: TBD;  Treat with IV Iron first.  Low Iron levels.   Iron regimen:  Treat with IV Iron.    *ok to stop oral Iron.  No improvement in Iron levels with Oral Iron.   Labs : 2025  Recent BRANDON use, transfusion, IV iron: NA  RX/TX plans : TBD     *staying in [place].      No history of stroke, MI, and blood clots or cancers.     Contact:            No Consent to Communicate on File.            Latest Ref Rng & Units 2024   Anemia   Hemoglobin 11.7 - 15.7 g/dL 8.7    9.5  10.3    IV Iron Dose   750mg 750mg     TSAT 15 - 46 % 21    65  37    Ferritin 11 - 328 ng/mL 83    1,643  776      BP Readings from Last 3 Encounters:   24 107/68   24 (!) 146/83   24 (!) 145/85     Wt Readings from Last 2 Encounters:   24 86.8 kg (191 lb 6.4 oz)   24 90 kg (198 lb 6.4 oz)           ASSESSMENT:    Hgb:at goal - continue to monitor  TSat: at goal >30% Ferritin: At goal (>100ng/mL)        PLAN:  RTC for Anemia Labs in 4 week(s).    Orders needed to be renewed (for next follow-up date) in EPIC: None    Iron labs due:  Early May 2024    Plan discussed with:  No call, chart review      NEXT FOLLOW-UP DATE:  24    Archana Salamanca RN   Anemia Services  [facility]  [email or web address]   Office : [phone]  Fax: [phone]

## 2024-04-07 ENCOUNTER — HEALTH MAINTENANCE LETTER (OUTPATIENT)
Age: 59
End: 2024-04-07

## 2024-04-09 ENCOUNTER — OFFICE VISIT (OUTPATIENT)
Dept: NEPHROLOGY | Facility: CLINIC | Age: 59
End: 2024-04-09
Payer: MEDICARE

## 2024-04-09 VITALS
DIASTOLIC BLOOD PRESSURE: 81 MMHG | SYSTOLIC BLOOD PRESSURE: 134 MMHG | HEART RATE: 82 BPM | WEIGHT: 194 LBS | OXYGEN SATURATION: 96 %

## 2024-04-09 DIAGNOSIS — N95.1 MENOPAUSAL VAGINAL DRYNESS: ICD-10-CM

## 2024-04-09 DIAGNOSIS — N18.4 CKD (CHRONIC KIDNEY DISEASE), STAGE IV (H): Primary | ICD-10-CM

## 2024-04-09 DIAGNOSIS — E11.9 TYPE 2 DIABETES MELLITUS WITHOUT COMPLICATION, WITHOUT LONG-TERM CURRENT USE OF INSULIN (H): ICD-10-CM

## 2024-04-09 PROCEDURE — G2211 COMPLEX E/M VISIT ADD ON: HCPCS | Performed by: INTERNAL MEDICINE

## 2024-04-09 PROCEDURE — 99215 OFFICE O/P EST HI 40 MIN: CPT | Performed by: INTERNAL MEDICINE

## 2024-04-09 RX ORDER — ALBUTEROL SULFATE 90 UG/1
AEROSOL, METERED RESPIRATORY (INHALATION)
COMMUNITY
Start: 2023-10-11

## 2024-04-09 RX ORDER — ATORVASTATIN CALCIUM 10 MG/1
10 TABLET, FILM COATED ORAL DAILY
COMMUNITY
End: 2024-10-07

## 2024-04-09 RX ORDER — DAPAGLIFLOZIN 10 MG/1
10 TABLET, FILM COATED ORAL DAILY
Qty: 90 TABLET | Refills: 3 | Status: SHIPPED | OUTPATIENT
Start: 2024-04-09 | End: 2024-06-21

## 2024-04-09 RX ORDER — TRIAMCINOLONE ACETONIDE 1 MG/G
OINTMENT TOPICAL
COMMUNITY

## 2024-04-09 ASSESSMENT — PAIN SCALES - GENERAL: PAINLEVEL: SEVERE PAIN (6)

## 2024-04-09 NOTE — PATIENT INSTRUCTIONS
It was a pleasure taking care of you today.  I've included a brief summary of our discussion and care plan from today's visit below.  Please review this information with your primary care provider.     My recommendations are summarized as follows:  -when you arrive home, just make sure you are taking either rosuvastatin or atorvastatin(Cholesterol medications)  -Please start vaginal cream twice weekly till end of April and see if that helps with the bladder issue   -Start Farxiga after a trial of estrogen  -Watch your sugars at home  -Update me end of april    Who do I call with any questions after my visit?  Please be in touch if there are any further questions that arise following today's visit.  There are multiple ways to contact your nephrology care team.       During business hours, you may reach your Nephrology Care Team or schedule or reschedule an appointment or lab at [phone].       If you need to schedule imaging, please call [phone].   To schedule a COVID test, please call [phone].     You can always send a secure message through [employer]. [employer] messages are answered by your nurse or doctor typically within 24-48 hours. Please allow extra time on weekends and holidays.       For urgent/emergent questions after business hours, you may reach the on-call Nephrology Fellow by contacting the [facility]  at [phone].     How will I get the results of any tests ordered?    You will receive all of your results.  If you have signed up for [employer], any tests ordered at your visit will be available to you once resulted on [employer]. Typically the physician reviews them and may or may not make further recommendations. If there are urgent results that require a change in your care plan, your physician or nurse will call you to discuss the next steps. If you are not on MyChart, a letter may be generated and mailed to you with your results.
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
## 2024-04-09 NOTE — NURSING NOTE
Evelyn Cisneros's goals for this visit include:   Chief Complaint   Patient presents with    RECHECK     3mo recheck CKD       She requests these members of her care team be copied on today's visit information: no    PCP: Kate Block    Referring Provider:  Referred Self, MD  No address on file    /81 (BP Location: Left arm, Patient Position: Sitting, Cuff Size: Adult Regular)   Pulse 82   Wt 88 kg (194 lb)   SpO2 96%     Do you need any medication refills at today's visit? Unsure    Jackie Zayas LPN

## 2024-04-25 ENCOUNTER — PATIENT OUTREACH (OUTPATIENT)
Dept: NEPHROLOGY | Facility: CLINIC | Age: 59
End: 2024-04-25
Payer: MEDICARE

## 2024-05-06 ENCOUNTER — PATIENT OUTREACH (OUTPATIENT)
Dept: CARE COORDINATION | Facility: CLINIC | Age: 59
End: 2024-05-06
Payer: MEDICARE

## 2024-05-06 NOTE — PROGRESS NOTES
Anemia Management Note - Reminder     Follow-up with anemia management service:    Lab appts on 4/17 & 4/23 were canceled. Evelyn has not rescheduled those appts yet. Will follow up in one week.         Latest Ref Rng & Units 1/11/2024 1/23/2024 1/30/2024 2/6/2024 4/4/2024   Anemia   Hemoglobin 11.7 - 15.7 g/dL 8.7    9.5  10.3    IV Iron Dose   750mg 750mg     TSAT 15 - 46 % 21    65  37    Ferritin 11 - 328 ng/mL 83    0,619 792          Follow-up call date: 5/13/24    Archana Salamanca RN   Anemia Services  [facility]  [email or web address]   Office : [phone]  Fax: [phone]

## 2024-05-07 ENCOUNTER — TELEPHONE (OUTPATIENT)
Dept: NEPHROLOGY | Facility: CLINIC | Age: 59
End: 2024-05-07
Payer: MEDICARE

## 2024-05-07 NOTE — TELEPHONE ENCOUNTER
Called and spoke with pt. Offered pt a sooner InClinic appointment with Dr. Blood 6/21/24 at 11:00am.  Pt agreed with date and time and assisted with scheduling a lab appt per Pt request at the [place] location 6/11/24.    Pt had no further questions.    Jackie Zayas LPN

## 2024-05-13 NOTE — PROGRESS NOTES
Labs are scheduled for 6/11/24.     Archana Salamanca RN   Anemia Services  [facility]  [email or web address]   Office : [phone]  Fax: [phone]

## 2024-06-11 ENCOUNTER — LAB (OUTPATIENT)
Dept: LAB | Facility: CLINIC | Age: 59
End: 2024-06-11
Payer: MEDICARE

## 2024-06-11 DIAGNOSIS — N18.4 CKD (CHRONIC KIDNEY DISEASE) STAGE 4, GFR 15-29 ML/MIN (H): ICD-10-CM

## 2024-06-11 DIAGNOSIS — N18.4 ANEMIA OF CHRONIC RENAL FAILURE, STAGE 4 (SEVERE) (H): ICD-10-CM

## 2024-06-11 DIAGNOSIS — D63.1 ANEMIA OF CHRONIC RENAL FAILURE, STAGE 4 (SEVERE) (H): ICD-10-CM

## 2024-06-11 LAB
FERRITIN SERPL-MCNC: 699 NG/ML (ref 11–328)
HCT VFR BLD AUTO: 31.8 % (ref 35–47)
HGB BLD-MCNC: 10.5 G/DL (ref 11.7–15.7)
IRON BINDING CAPACITY (ROCHE): 171 UG/DL (ref 240–430)
IRON SATN MFR SERPL: 44 % (ref 15–46)
IRON SERPL-MCNC: 75 UG/DL (ref 37–145)

## 2024-06-11 PROCEDURE — 83550 IRON BINDING TEST: CPT

## 2024-06-11 PROCEDURE — 36415 COLL VENOUS BLD VENIPUNCTURE: CPT

## 2024-06-11 PROCEDURE — 85014 HEMATOCRIT: CPT

## 2024-06-11 PROCEDURE — 85018 HEMOGLOBIN: CPT

## 2024-06-11 PROCEDURE — 82728 ASSAY OF FERRITIN: CPT

## 2024-06-11 PROCEDURE — 83540 ASSAY OF IRON: CPT

## 2024-06-12 ENCOUNTER — PATIENT OUTREACH (OUTPATIENT)
Dept: CARE COORDINATION | Facility: CLINIC | Age: 59
End: 2024-06-12
Payer: MEDICARE

## 2024-06-12 NOTE — PROGRESS NOTES
Anemia Management Note - Follow Up      SUBJECTIVE/OBJECTIVE:    Referred by Dr. Nathan Blood on 2024  Primary Diagnosis: Anemia in Chronic Kidney Disease (N18.4, D63.1)     Secondary Diagnosis:  Chronic Kidney Disease, Stage 4 (N18.4)   Hgb goal range:  9-10  Epo/Darbo: TBD;  Treat with IV Iron first.  Low Iron levels.   Iron regimen:  Treat with IV Iron.    *ok to stop oral Iron.  No improvement in Iron levels with Oral Iron.   Labs : 2025  Recent BRANDON use, transfusion, IV iron: NA  RX/TX plans : TBD     *staying in [place].      No history of stroke, MI, and blood clots or cancers.     Contact:            No Consent to Communicate on File.         Latest Ref Rng & Units 2024   Anemia   Hemoglobin 11.7 - 15.7 g/dL 8.7    9.5  10.3  10.5    IV Iron Dose   750mg 750mg      TSAT 15 - 46 % 21    65  37  44    Ferritin 11 - 328 ng/mL 83    1,643  776  699      BP Readings from Last 3 Encounters:   24 134/81   24 107/68   24 (!) 146/83     Wt Readings from Last 2 Encounters:   24 88 kg (194 lb)   24 86.8 kg (191 lb 6.4 oz)           ASSESSMENT:    Hgb:at goal - continue to monitor  TSat: at goal >30% Ferritin: At goal (>100ng/mL)        PLAN:  Check iron studies in 4 week(s).    Orders needed to be renewed (for next follow-up date) in EPIC: None    Iron labs due:  Mid 2024    Plan discussed with:  No call, chart review       NEXT FOLLOW-UP DATE:  7/10/24    Archana Salamanca RN   Anemia Services  [facility]  [email or web address]   Office : [phone]  Fax: [phone]

## 2024-06-16 ENCOUNTER — HEALTH MAINTENANCE LETTER (OUTPATIENT)
Age: 59
End: 2024-06-16

## 2024-06-21 ENCOUNTER — PATIENT OUTREACH (OUTPATIENT)
Dept: NEPHROLOGY | Facility: CLINIC | Age: 59
End: 2024-06-21

## 2024-06-21 ENCOUNTER — OFFICE VISIT (OUTPATIENT)
Dept: NEPHROLOGY | Facility: CLINIC | Age: 59
End: 2024-06-21
Payer: MEDICARE

## 2024-06-21 VITALS
DIASTOLIC BLOOD PRESSURE: 79 MMHG | SYSTOLIC BLOOD PRESSURE: 118 MMHG | HEART RATE: 77 BPM | WEIGHT: 196 LBS | OXYGEN SATURATION: 96 %

## 2024-06-21 DIAGNOSIS — N18.4 CKD (CHRONIC KIDNEY DISEASE), STAGE IV (H): ICD-10-CM

## 2024-06-21 DIAGNOSIS — N18.4 CKD STAGE 4 SECONDARY TO HYPERTENSION (H): ICD-10-CM

## 2024-06-21 DIAGNOSIS — I12.9 CKD STAGE 4 SECONDARY TO HYPERTENSION (H): ICD-10-CM

## 2024-06-21 DIAGNOSIS — I12.9 CKD STAGE 4 SECONDARY TO HYPERTENSION (H): Primary | ICD-10-CM

## 2024-06-21 DIAGNOSIS — N18.4 CKD (CHRONIC KIDNEY DISEASE) STAGE 4, GFR 15-29 ML/MIN (H): ICD-10-CM

## 2024-06-21 DIAGNOSIS — N18.4 ANEMIA OF CHRONIC RENAL FAILURE, STAGE 4 (SEVERE) (H): Primary | ICD-10-CM

## 2024-06-21 DIAGNOSIS — N18.4 ANEMIA OF CHRONIC RENAL FAILURE, STAGE 4 (SEVERE) (H): ICD-10-CM

## 2024-06-21 DIAGNOSIS — D63.1 ANEMIA OF CHRONIC RENAL FAILURE, STAGE 4 (SEVERE) (H): ICD-10-CM

## 2024-06-21 DIAGNOSIS — N18.4 CKD STAGE 4 SECONDARY TO HYPERTENSION (H): Primary | ICD-10-CM

## 2024-06-21 DIAGNOSIS — D63.1 ANEMIA OF CHRONIC RENAL FAILURE, STAGE 4 (SEVERE) (H): Primary | ICD-10-CM

## 2024-06-21 LAB
ALBUMIN SERPL BCG-MCNC: 4.2 G/DL (ref 3.5–5.2)
ANION GAP SERPL CALCULATED.3IONS-SCNC: 12 MMOL/L (ref 7–15)
BUN SERPL-MCNC: 30.5 MG/DL (ref 8–23)
CALCIUM SERPL-MCNC: 9.2 MG/DL (ref 8.8–10.2)
CHLORIDE SERPL-SCNC: 109 MMOL/L (ref 98–107)
CREAT SERPL-MCNC: 2.54 MG/DL (ref 0.51–0.95)
CYSTATIN C (ROCHE): 3.1 MG/L (ref 0.6–1)
DEPRECATED HCO3 PLAS-SCNC: 22 MMOL/L (ref 22–29)
EGFRCR SERPLBLD CKD-EPI 2021: 21 ML/MIN/1.73M2
ERYTHROCYTE [DISTWIDTH] IN BLOOD BY AUTOMATED COUNT: 12.7 % (ref 10–15)
GFR SERPL CREATININE-BSD FRML MDRD: 16 ML/MIN/1.73M2
GLUCOSE SERPL-MCNC: 226 MG/DL (ref 70–99)
HCT VFR BLD AUTO: 29.8 % (ref 35–47)
HGB BLD-MCNC: 9.8 G/DL (ref 11.7–15.7)
MCH RBC QN AUTO: 30.6 PG (ref 26.5–33)
MCHC RBC AUTO-ENTMCNC: 32.9 G/DL (ref 31.5–36.5)
MCV RBC AUTO: 93 FL (ref 78–100)
PHOSPHATE SERPL-MCNC: 4.9 MG/DL (ref 2.5–4.5)
PLATELET # BLD AUTO: 168 10E3/UL (ref 150–450)
POTASSIUM SERPL-SCNC: 3.5 MMOL/L (ref 3.4–5.3)
PTH-INTACT SERPL-MCNC: 70 PG/ML (ref 15–65)
RBC # BLD AUTO: 3.2 10E6/UL (ref 3.8–5.2)
SODIUM SERPL-SCNC: 143 MMOL/L (ref 135–145)
WBC # BLD AUTO: 6.8 10E3/UL (ref 4–11)

## 2024-06-21 PROCEDURE — 80069 RENAL FUNCTION PANEL: CPT | Performed by: INTERNAL MEDICINE

## 2024-06-21 PROCEDURE — 82610 CYSTATIN C: CPT | Performed by: INTERNAL MEDICINE

## 2024-06-21 PROCEDURE — 85027 COMPLETE CBC AUTOMATED: CPT | Performed by: INTERNAL MEDICINE

## 2024-06-21 PROCEDURE — 99214 OFFICE O/P EST MOD 30 MIN: CPT | Performed by: INTERNAL MEDICINE

## 2024-06-21 PROCEDURE — 36415 COLL VENOUS BLD VENIPUNCTURE: CPT | Performed by: INTERNAL MEDICINE

## 2024-06-21 PROCEDURE — 83970 ASSAY OF PARATHORMONE: CPT | Performed by: INTERNAL MEDICINE

## 2024-06-21 PROCEDURE — G2211 COMPLEX E/M VISIT ADD ON: HCPCS | Performed by: INTERNAL MEDICINE

## 2024-06-21 ASSESSMENT — PAIN SCALES - GENERAL: PAINLEVEL: SEVERE PAIN (7)
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
## 2024-06-21 NOTE — PATIENT INSTRUCTIONS
It was a pleasure taking care of you today.  I've included a brief summary of our discussion and care plan from today's visit below.  Please review this information with your primary care provider.     My recommendations are summarized as follows:  -will stop farxiga. Please contact your primary care for an alternative    Who do I call with any questions after my visit?  Please be in touch if there are any further questions that arise following today's visit.  There are multiple ways to contact your nephrology care team.       During business hours, you may reach your Nephrology Care Team or schedule or reschedule an appointment or lab at [phone].       If you need to schedule imaging, please call [phone].   To schedule a COVID test, please call [phone].     You can always send a secure message through Habeas. Habeas messages are answered by your nurse or doctor typically within 24-48 hours. Please allow extra time on weekends and holidays.       For urgent/emergent questions after business hours, you may reach the on-call Nephrology Fellow by contacting the [facility]  at [phone].     How will I get the results of any tests ordered?    You will receive all of your results.  If you have signed up for Habeas, any tests ordered at your visit will be available to you once resulted on Habeas. Typically the physician reviews them and may or may not make further recommendations. If there are urgent results that require a change in your care plan, your physician or nurse will call you to discuss the next steps. If you are not on Birstt, a letter may be generated and mailed to you with your results.

## 2024-06-21 NOTE — PROGRESS NOTES
Neph Tracking Flowsheet Last Filled Values       CKD Education Status Referred    CKD Education Referral Date 01/22/24    CKD Education Completed Date 03/13/24    CKD Education Type Kidney Smart Modality Education; Kidney Smart CKD Basics  Dr. August Course Completed    Preferred Modality Peritoneal Dialysis  per notes on 4/9/2024- might be leaning towards PD    Patient's Referral Dates Auto Populate Patient's Referral Dates    Anemia Services Referral 1/11/24    Journey Referral 1/11/24    Transplant Status  Not Referred  4/25/2024- GFR too high at this time          Patient had scheduled visit with Dr. Blood today at [facility]. 3 month follow up scheduled with Barb Helms- PAC at [facility] and 6 month follow up to be scheduled with Dr. Blood at [facility]. Lab orders to be placed appropriately.     Received the following message from Dr. Blood regarding anemia service:     Can she get back in touch with anemia clinic to have an hb goal of 10-11? Thanks     Will route to anemia team to review and address.     [place] [facility] [place]  given [place] prefers  site location stating it is closer to home.     Scheduled RNCC outreach for +2 months.

## 2024-06-21 NOTE — NURSING NOTE
Evelyn Cisneros's goals for this visit include:   Chief Complaint   Patient presents with    RECHECK     2mo recheck CKD/HTN/anemia       She requests these members of her care team be copied on today's visit information:   Chief Complaint   Patient presents with    RECHECK     2mo recheck CKD/HTN/anemia       PCP: Renetta Aguilar    Referring Provider:  Referred Self, MD  No address on file    /79 (BP Location: Left arm, Patient Position: Sitting, Cuff Size: Adult Regular)   Pulse 77   Wt 88.9 kg (196 lb)   SpO2 96%     Do you need any medication refills at today's visit? Unsure    Jackie Zayas LPN

## 2024-06-21 NOTE — Clinical Note
Hi Anemia team- Dr. Blood would like this patient to be back on your radar and is requesting that her hgb goal change to 10-11. Let me know if any issues. The patient will be expecting a call.  Thank you so much! Atiya

## 2024-07-10 ENCOUNTER — PATIENT OUTREACH (OUTPATIENT)
Dept: CARE COORDINATION | Facility: CLINIC | Age: 59
End: 2024-07-10
Payer: MEDICARE

## 2024-07-10 DIAGNOSIS — D63.1 ANEMIA OF CHRONIC RENAL FAILURE, STAGE 4 (SEVERE) (H): Primary | ICD-10-CM

## 2024-07-10 DIAGNOSIS — N18.4 ANEMIA OF CHRONIC RENAL FAILURE, STAGE 4 (SEVERE) (H): Primary | ICD-10-CM

## 2024-07-10 RX ORDER — METHYLPREDNISOLONE SODIUM SUCCINATE 125 MG/2ML
125 INJECTION, POWDER, LYOPHILIZED, FOR SOLUTION INTRAMUSCULAR; INTRAVENOUS
Status: CANCELLED
Start: 2024-07-10

## 2024-07-10 RX ORDER — DIPHENHYDRAMINE HYDROCHLORIDE 50 MG/ML
50 INJECTION INTRAMUSCULAR; INTRAVENOUS
Status: CANCELLED
Start: 2024-07-10

## 2024-07-10 RX ORDER — MEPERIDINE HYDROCHLORIDE 25 MG/ML
25 INJECTION INTRAMUSCULAR; INTRAVENOUS; SUBCUTANEOUS EVERY 30 MIN PRN
Status: CANCELLED | OUTPATIENT
Start: 2024-07-10

## 2024-07-10 RX ORDER — EPINEPHRINE 1 MG/ML
0.3 INJECTION, SOLUTION, CONCENTRATE INTRAVENOUS EVERY 5 MIN PRN
Status: CANCELLED | OUTPATIENT
Start: 2024-07-10

## 2024-07-10 RX ORDER — ALBUTEROL SULFATE 0.83 MG/ML
2.5 SOLUTION RESPIRATORY (INHALATION)
Status: CANCELLED | OUTPATIENT
Start: 2024-07-10

## 2024-07-10 RX ORDER — ALBUTEROL SULFATE 90 UG/1
1-2 AEROSOL, METERED RESPIRATORY (INHALATION)
Status: CANCELLED
Start: 2024-07-10

## 2024-07-10 NOTE — PROGRESS NOTES
Anemia Management Note - Follow Up      SUBJECTIVE/OBJECTIVE:    Referred by Dr. Nathan Blood on 2024  Primary Diagnosis: Anemia in Chronic Kidney Disease (N18.4, D63.1)     Secondary Diagnosis:  Chronic Kidney Disease, Stage 4 (N18.4)   Hgb goal range:  10-11 *range increased on 24 per Dr. Blood    Epo/Darbo: Aranesp 60mcg every 14 days for Hgb <11.0. In Clinic.   Iron regimen:  Treat with IV Iron as needed.     *ok to stop oral Iron.  No improvement in Iron levels with Oral Iron.   *completed Injectafer on 24.     Labs : 2025  RX/TX plans : 7/10/2025     *staying in [place].      No history of stroke, MI, and blood clots or cancers.  Recent BRANDON use, transfusion, IV iron: NA     Contact:            No Consent to Communicate on File.         Latest Ref Rng & Units 2024   Anemia   Hemoglobin 11.7 - 15.7 g/dL 8.7    9.5  10.3  10.5  9.8    IV Iron Dose   750mg 750mg       TSAT 15 - 46 % 21    65  37  44     Ferritin 11 - 328 ng/mL 83    1,643  776  699          BP Readings from Last 3 Encounters:   24 118/79   24 134/81   24 107/68     Wt Readings from Last 2 Encounters:   24 88.9 kg (196 lb)   24 88 kg (194 lb)           ASSESSMENT:    Hgb:at goal - start BRANDON per Dr. Blood  TSat: at goal >30% Ferritin: At goal (>100ng/mL)        PLAN:  Start Aranesp.  Evelyn prefers to go to the [facility]. Message sent to the scheduling pool to call Evelyn and schedule her for Aranesp. .    Orders needed to be renewed (for next follow-up date) in EPIC: None    Iron labs due:  DUE    Plan discussed with:  Evelyn.       NEXT FOLLOW-UP DATE:  24    Archana Salamanca RN   Anemia Services  [facility]  [email or web address]   Office : [phone]  Fax: [phone]

## 2024-07-16 NOTE — PROGRESS NOTES
Raven is scheduled for 7/22/24.    Archana Salamanca RN   Anemia Services  [facility]  [email or web address]   Office : [phone]  Fax: [phone]

## 2024-07-22 ENCOUNTER — LAB (OUTPATIENT)
Dept: INFUSION THERAPY | Facility: CLINIC | Age: 59
End: 2024-07-22
Attending: INTERNAL MEDICINE
Payer: MEDICARE

## 2024-07-22 VITALS — DIASTOLIC BLOOD PRESSURE: 79 MMHG | HEART RATE: 70 BPM | SYSTOLIC BLOOD PRESSURE: 130 MMHG | OXYGEN SATURATION: 96 %

## 2024-07-22 DIAGNOSIS — D63.1 ANEMIA OF CHRONIC RENAL FAILURE, STAGE 4 (SEVERE) (H): ICD-10-CM

## 2024-07-22 DIAGNOSIS — I12.9 CKD STAGE 4 SECONDARY TO HYPERTENSION (H): ICD-10-CM

## 2024-07-22 DIAGNOSIS — N18.4 CKD STAGE 4 SECONDARY TO HYPERTENSION (H): ICD-10-CM

## 2024-07-22 DIAGNOSIS — D63.1 ANEMIA OF CHRONIC RENAL FAILURE, STAGE 4 (SEVERE) (H): Primary | ICD-10-CM

## 2024-07-22 DIAGNOSIS — N18.4 ANEMIA OF CHRONIC RENAL FAILURE, STAGE 4 (SEVERE) (H): ICD-10-CM

## 2024-07-22 DIAGNOSIS — N18.4 ANEMIA OF CHRONIC RENAL FAILURE, STAGE 4 (SEVERE) (H): Primary | ICD-10-CM

## 2024-07-22 LAB
ALBUMIN SERPL BCG-MCNC: 4.5 G/DL (ref 3.5–5.2)
ALBUMIN UR-MCNC: 20 MG/DL
ANION GAP SERPL CALCULATED.3IONS-SCNC: 13 MMOL/L (ref 7–15)
APPEARANCE UR: ABNORMAL
BACTERIA #/AREA URNS HPF: ABNORMAL /HPF
BILIRUB UR QL STRIP: NEGATIVE
BUN SERPL-MCNC: 27.7 MG/DL (ref 8–23)
CALCIUM SERPL-MCNC: 9.7 MG/DL (ref 8.8–10.4)
CHLORIDE SERPL-SCNC: 109 MMOL/L (ref 98–107)
COLOR UR AUTO: ABNORMAL
CREAT SERPL-MCNC: 2.28 MG/DL (ref 0.51–0.95)
CREAT UR-MCNC: 71.7 MG/DL
EGFRCR SERPLBLD CKD-EPI 2021: 24 ML/MIN/1.73M2
ERYTHROCYTE [DISTWIDTH] IN BLOOD BY AUTOMATED COUNT: 12.4 % (ref 10–15)
GLUCOSE SERPL-MCNC: 145 MG/DL (ref 70–99)
GLUCOSE UR STRIP-MCNC: >1000 MG/DL
HCO3 SERPL-SCNC: 22 MMOL/L (ref 22–29)
HCT VFR BLD AUTO: 32.4 % (ref 35–47)
HGB BLD-MCNC: 10.3 G/DL (ref 11.7–15.7)
HGB UR QL STRIP: NEGATIVE
HOLD SPECIMEN: NORMAL
KETONES UR STRIP-MCNC: NEGATIVE MG/DL
LEUKOCYTE ESTERASE UR QL STRIP: ABNORMAL
MCH RBC QN AUTO: 30.1 PG (ref 26.5–33)
MCHC RBC AUTO-ENTMCNC: 31.8 G/DL (ref 31.5–36.5)
MCV RBC AUTO: 95 FL (ref 78–100)
MICROALBUMIN UR-MCNC: 13.9 MG/L
MICROALBUMIN/CREAT UR: 19.39 MG/G CR (ref 0–25)
NITRATE UR QL: NEGATIVE
PH UR STRIP: 5.5 [PH] (ref 5–7)
PHOSPHATE SERPL-MCNC: 3.2 MG/DL (ref 2.5–4.5)
PLATELET # BLD AUTO: 195 10E3/UL (ref 150–450)
POTASSIUM SERPL-SCNC: 3.4 MMOL/L (ref 3.4–5.3)
PTH-INTACT SERPL-MCNC: 80 PG/ML (ref 15–65)
RBC # BLD AUTO: 3.42 10E6/UL (ref 3.8–5.2)
RBC #/AREA URNS AUTO: ABNORMAL /HPF
SKIP: ABNORMAL
SODIUM SERPL-SCNC: 144 MMOL/L (ref 135–145)
SP GR UR STRIP: 1.01 (ref 1–1.03)
SQUAMOUS #/AREA URNS AUTO: ABNORMAL /LPF
UROBILINOGEN UR STRIP-MCNC: NORMAL MG/DL
WBC # BLD AUTO: 7.4 10E3/UL (ref 4–11)
WBC #/AREA URNS AUTO: ABNORMAL /HPF
YEAST #/AREA URNS HPF: ABNORMAL /HPF

## 2024-07-22 PROCEDURE — 83970 ASSAY OF PARATHORMONE: CPT

## 2024-07-22 PROCEDURE — 96372 THER/PROPH/DIAG INJ SC/IM: CPT | Performed by: INTERNAL MEDICINE

## 2024-07-22 PROCEDURE — 81003 URINALYSIS AUTO W/O SCOPE: CPT

## 2024-07-22 PROCEDURE — 80069 RENAL FUNCTION PANEL: CPT

## 2024-07-22 PROCEDURE — 85027 COMPLETE CBC AUTOMATED: CPT

## 2024-07-22 PROCEDURE — 82043 UR ALBUMIN QUANTITATIVE: CPT

## 2024-07-22 PROCEDURE — 36415 COLL VENOUS BLD VENIPUNCTURE: CPT

## 2024-07-22 PROCEDURE — 81001 URINALYSIS AUTO W/SCOPE: CPT

## 2024-07-22 PROCEDURE — 250N000011 HC RX IP 250 OP 636: Performed by: INTERNAL MEDICINE

## 2024-07-22 RX ORDER — METHYLPREDNISOLONE SODIUM SUCCINATE 125 MG/2ML
125 INJECTION, POWDER, LYOPHILIZED, FOR SOLUTION INTRAMUSCULAR; INTRAVENOUS
Status: CANCELLED
Start: 2024-08-05

## 2024-07-22 RX ORDER — ALBUTEROL SULFATE 0.83 MG/ML
2.5 SOLUTION RESPIRATORY (INHALATION)
Status: CANCELLED | OUTPATIENT
Start: 2024-08-05

## 2024-07-22 RX ORDER — ALBUTEROL SULFATE 90 UG/1
1-2 AEROSOL, METERED RESPIRATORY (INHALATION)
Status: CANCELLED
Start: 2024-08-05

## 2024-07-22 RX ORDER — MEPERIDINE HYDROCHLORIDE 25 MG/ML
25 INJECTION INTRAMUSCULAR; INTRAVENOUS; SUBCUTANEOUS EVERY 30 MIN PRN
Status: CANCELLED | OUTPATIENT
Start: 2024-08-05

## 2024-07-22 RX ORDER — LINACLOTIDE 145 UG/1
145 CAPSULE, GELATIN COATED ORAL
COMMUNITY
Start: 2024-06-28 | End: 2024-10-07

## 2024-07-22 RX ORDER — DIPHENHYDRAMINE HYDROCHLORIDE 50 MG/ML
50 INJECTION INTRAMUSCULAR; INTRAVENOUS
Status: CANCELLED
Start: 2024-08-05

## 2024-07-22 RX ORDER — EPINEPHRINE 1 MG/ML
0.3 INJECTION, SOLUTION INTRAMUSCULAR; SUBCUTANEOUS EVERY 5 MIN PRN
Status: CANCELLED | OUTPATIENT
Start: 2024-08-05

## 2024-07-22 RX ORDER — LINAGLIPTIN 5 MG/1
5 TABLET, FILM COATED ORAL
COMMUNITY
Start: 2024-07-10

## 2024-07-22 RX ADMIN — DARBEPOETIN ALFA 60 MCG: 60 INJECTION, SOLUTION INTRAVENOUS; SUBCUTANEOUS at 14:55

## 2024-07-22 ASSESSMENT — PAIN SCALES - GENERAL: PAINLEVEL: EXTREME PAIN (8)

## 2024-07-22 NOTE — PROGRESS NOTES
Infusion Nursing Note:  [facility]    Patient seen by provider today: No   present during visit today: Not Applicable.    Note: Assessment performed by Latoya LUDWIG RN prior to injection today. .      Intravenous Access:  No Intravenous access/labs at this visit.    Treatment Conditions:  Lab Results   Component Value Date    HGB 10.3 (L) 07/22/2024    WBC 7.4 07/22/2024    ANEU 2.9 01/14/2016     07/22/2024        Results reviewed, labs MET treatment parameters, ok to proceed with treatment.      Post Infusion Assessment:  Patient tolerated injection without incident.  Site patent and intact, free from redness, edema or discomfort.       Discharge Plan:   Departure Mode: Ambulatory.      Samantha Tobar MA

## 2024-07-23 ENCOUNTER — PATIENT OUTREACH (OUTPATIENT)
Dept: CARE COORDINATION | Facility: CLINIC | Age: 59
End: 2024-07-23
Payer: MEDICARE

## 2024-07-23 NOTE — PROGRESS NOTES
Anemia Management Note - Follow Up      SUBJECTIVE/OBJECTIVE:    Referred by Dr. Nathan Blood on 2024  Primary Diagnosis: Anemia in Chronic Kidney Disease (N18.4, D63.1)     Secondary Diagnosis:  Chronic Kidney Disease, Stage 4 (N18.4)   Hgb goal range:  10-11 *range increased on 24 per Dr. Blood     Epo/Darbo: Aranesp 60mcg every 14 days for Hgb <11.0. In Clinic.   Iron regimen:  Treat with IV Iron as needed.     *ok to stop oral Iron.  No improvement in Iron levels with Oral Iron.   *completed Injectafer on 24.      Labs : 2025  RX/TX plans : 7/10/2025     *staying in [place].      No history of stroke, MI, and blood clots or cancers.  Recent BRANDON use, transfusion, IV iron: NA     Contact:            No Consent to Communicate on File.         Latest Ref Rng & Units 2024   Anemia   HGB Goal        10 - 11   BRANDON Dose        60 mcg   Hemoglobin 11.7 - 15.7 g/dL   9.5  10.3  10.5  9.8  10.3    IV Iron Dose  750mg 750mg        TSAT 15 - 46 %   65  37  44      Ferritin 11 - 328 ng/mL   1,643  776  699           BP Readings from Last 3 Encounters:   24 130/79   24 118/79   24 134/81     Wt Readings from Last 2 Encounters:   24 88.9 kg (196 lb)   24 88 kg (194 lb)           ASSESSMENT:    Hgb:at goal - received dose in clinic - recommend continue current regimen  TSat: at goal >30% Ferritin: At goal (>100ng/mL)        PLAN:  Dose with Aranesp.  RTC for hgb then Aranesp if needed in 2 week(s).    Orders needed to be renewed (for next follow-up date) in EPIC: None    Iron labs due:  DUE    Plan discussed with:  No call, chart review      NEXT FOLLOW-UP DATE:  24    Archana Salamanca RN   Anemia Services  [facility]  [email or web address]   Office : [phone]  Fax: [phone]

## 2024-08-06 ENCOUNTER — LAB (OUTPATIENT)
Dept: INFUSION THERAPY | Facility: CLINIC | Age: 59
End: 2024-08-06
Attending: INTERNAL MEDICINE
Payer: MEDICARE

## 2024-08-06 DIAGNOSIS — D63.1 ANEMIA OF CHRONIC RENAL FAILURE, STAGE 4 (SEVERE) (H): ICD-10-CM

## 2024-08-06 DIAGNOSIS — N18.4 ANEMIA OF CHRONIC RENAL FAILURE, STAGE 4 (SEVERE) (H): ICD-10-CM

## 2024-08-06 DIAGNOSIS — I12.9 CKD STAGE 4 SECONDARY TO HYPERTENSION (H): ICD-10-CM

## 2024-08-06 DIAGNOSIS — N18.4 CKD STAGE 4 SECONDARY TO HYPERTENSION (H): ICD-10-CM

## 2024-08-06 DIAGNOSIS — N18.4 CKD (CHRONIC KIDNEY DISEASE) STAGE 4, GFR 15-29 ML/MIN (H): ICD-10-CM

## 2024-08-06 LAB
FERRITIN SERPL-MCNC: 674 NG/ML (ref 11–328)
HCT VFR BLD AUTO: 34.1 % (ref 35–47)
HGB BLD-MCNC: 11 G/DL (ref 11.7–15.7)
IRON BINDING CAPACITY (ROCHE): 170 UG/DL (ref 240–430)
IRON SATN MFR SERPL: 46 % (ref 15–46)
IRON SERPL-MCNC: 78 UG/DL (ref 37–145)

## 2024-08-06 PROCEDURE — 86235 NUCLEAR ANTIGEN ANTIBODY: CPT

## 2024-08-06 PROCEDURE — 36415 COLL VENOUS BLD VENIPUNCTURE: CPT

## 2024-08-06 PROCEDURE — 85014 HEMATOCRIT: CPT

## 2024-08-06 PROCEDURE — 82728 ASSAY OF FERRITIN: CPT

## 2024-08-06 PROCEDURE — 83550 IRON BINDING TEST: CPT

## 2024-08-06 PROCEDURE — 83540 ASSAY OF IRON: CPT

## 2024-08-06 NOTE — PROGRESS NOTES
Infusion Nursing Note:  Evelyn SHANIQUA Cisneros presents today for Aranesp.    Patient seen by provider today: No   present during visit today: Not Applicable.    Note: N/A.      Intravenous Access:  No Intravenous access/labs at this visit.    Treatment Conditions:  Lab Results   Component Value Date    HGB 11.0 (L) 08/06/2024    WBC 7.4 07/22/2024    ANEU 2.9 01/14/2016     07/22/2024        Results reviewed, labs did NOT meet treatment parameters: Parameters less than 11.  Patient did not receive injection.          Samantha Tobar MA

## 2024-08-07 LAB
ENA SS-A AB SER IA-ACNC: <0.5 U/ML
ENA SS-A AB SER IA-ACNC: NEGATIVE
ENA SS-B IGG SER IA-ACNC: <0.6 U/ML
ENA SS-B IGG SER IA-ACNC: NEGATIVE

## 2024-08-08 ENCOUNTER — PATIENT OUTREACH (OUTPATIENT)
Dept: CARE COORDINATION | Facility: CLINIC | Age: 59
End: 2024-08-08
Payer: MEDICARE

## 2024-08-08 NOTE — PROGRESS NOTES
Anemia Management Note - Follow Up      SUBJECTIVE/OBJECTIVE:    Referred by Dr. Nathan Blood on 2024  Primary Diagnosis: Anemia in Chronic Kidney Disease (N18.4, D63.1)     Secondary Diagnosis:  Chronic Kidney Disease, Stage 4 (N18.4)   Hgb goal range:  10-11 *range increased on 24 per Dr. Blood     Epo/Darbo: Aranesp 60mcg every 14 days for Hgb <11.0. In Clinic.   Iron regimen:  Treat with IV Iron as needed.     *ok to stop oral Iron.  No improvement in Iron levels with Oral Iron.   *completed Injectafer on 24.      Labs : 2025  RX/TX plans : 7/10/2025     *staying in [place].      No history of stroke, MI, and blood clots or cancers.  Recent BRANDON use, transfusion, IV iron: NA     Contact:            No Consent to Communicate on File.         Latest Ref Rng & Units 2024   Anemia   HGB Goal       10 - 11    BRANDON Dose       60 mcg    Hemoglobin 11.7 - 15.7 g/dL  9.5  10.3  10.5  9.8  10.3  11.0    IV Iron Dose  750mg         TSAT 15 - 46 %  65  37  44    46    Ferritin 11 - 328 ng/mL  1,643  776  699    674         BP Readings from Last 3 Encounters:   24 130/79   24 118/79   24 134/81     Wt Readings from Last 2 Encounters:   24 88.9 kg (196 lb)   24 88 kg (194 lb)           ASSESSMENT:    Hgb:Above goal - recommend hold dose  TSat: at goal >30% Ferritin: At goal (>100ng/mL)        PLAN:  Hold Aranesp.  RTC for hgb then Aranesp if needed in 2 week(s).    Orders needed to be renewed (for next follow-up date) in EPIC: None    Iron labs due:  2024    Plan discussed with:  No call, chart review       NEXT FOLLOW-UP DATE:  24    Archana Salamanca RN   Anemia Services  [email or web address]   Office : [phone]  Fax: [phone]

## 2024-08-22 ENCOUNTER — LAB (OUTPATIENT)
Dept: INFUSION THERAPY | Facility: CLINIC | Age: 59
End: 2024-08-22
Attending: INTERNAL MEDICINE
Payer: MEDICARE

## 2024-08-22 VITALS — SYSTOLIC BLOOD PRESSURE: 123 MMHG | HEART RATE: 68 BPM | OXYGEN SATURATION: 95 % | DIASTOLIC BLOOD PRESSURE: 77 MMHG

## 2024-08-22 DIAGNOSIS — N18.4 ANEMIA OF CHRONIC RENAL FAILURE, STAGE 4 (SEVERE) (H): ICD-10-CM

## 2024-08-22 DIAGNOSIS — N18.4 CKD (CHRONIC KIDNEY DISEASE) STAGE 4, GFR 15-29 ML/MIN (H): ICD-10-CM

## 2024-08-22 DIAGNOSIS — D63.1 ANEMIA OF CHRONIC RENAL FAILURE, STAGE 4 (SEVERE) (H): ICD-10-CM

## 2024-08-22 DIAGNOSIS — D63.1 ANEMIA OF CHRONIC RENAL FAILURE, STAGE 4 (SEVERE) (H): Primary | ICD-10-CM

## 2024-08-22 DIAGNOSIS — N18.4 ANEMIA OF CHRONIC RENAL FAILURE, STAGE 4 (SEVERE) (H): Primary | ICD-10-CM

## 2024-08-22 LAB
HCT VFR BLD AUTO: 31.6 % (ref 35–47)
HGB BLD-MCNC: 10.4 G/DL (ref 11.7–15.7)
HOLD SPECIMEN: NORMAL
HOLD SPECIMEN: NORMAL

## 2024-08-22 PROCEDURE — 99207 PR NO CHARGE LOS: CPT

## 2024-08-22 PROCEDURE — 85018 HEMOGLOBIN: CPT

## 2024-08-22 PROCEDURE — 96372 THER/PROPH/DIAG INJ SC/IM: CPT | Performed by: INTERNAL MEDICINE

## 2024-08-22 PROCEDURE — 36415 COLL VENOUS BLD VENIPUNCTURE: CPT

## 2024-08-22 PROCEDURE — 250N000011 HC RX IP 250 OP 636: Mod: EC,JZ | Performed by: INTERNAL MEDICINE

## 2024-08-22 RX ORDER — ALBUTEROL SULFATE 0.83 MG/ML
2.5 SOLUTION RESPIRATORY (INHALATION)
Status: DISCONTINUED | OUTPATIENT
Start: 2024-08-22 | End: 2024-08-22 | Stop reason: HOSPADM

## 2024-08-22 RX ORDER — ALBUTEROL SULFATE 90 UG/1
1-2 AEROSOL, METERED RESPIRATORY (INHALATION)
Status: DISCONTINUED | OUTPATIENT
Start: 2024-08-22 | End: 2024-08-22 | Stop reason: HOSPADM

## 2024-08-22 RX ORDER — ALBUTEROL SULFATE 90 UG/1
1-2 AEROSOL, METERED RESPIRATORY (INHALATION)
Status: CANCELLED
Start: 2024-09-05

## 2024-08-22 RX ORDER — ALBUTEROL SULFATE 0.83 MG/ML
2.5 SOLUTION RESPIRATORY (INHALATION)
Status: CANCELLED | OUTPATIENT
Start: 2024-09-05

## 2024-08-22 RX ORDER — MEPERIDINE HYDROCHLORIDE 25 MG/ML
25 INJECTION INTRAMUSCULAR; INTRAVENOUS; SUBCUTANEOUS EVERY 30 MIN PRN
Status: DISCONTINUED | OUTPATIENT
Start: 2024-08-22 | End: 2024-08-22 | Stop reason: HOSPADM

## 2024-08-22 RX ORDER — DIPHENHYDRAMINE HYDROCHLORIDE 50 MG/ML
50 INJECTION INTRAMUSCULAR; INTRAVENOUS
Status: CANCELLED
Start: 2024-09-05

## 2024-08-22 RX ORDER — EPINEPHRINE 1 MG/ML
0.3 INJECTION, SOLUTION INTRAMUSCULAR; SUBCUTANEOUS EVERY 5 MIN PRN
Status: DISCONTINUED | OUTPATIENT
Start: 2024-08-22 | End: 2024-08-22 | Stop reason: HOSPADM

## 2024-08-22 RX ORDER — METHYLPREDNISOLONE SODIUM SUCCINATE 125 MG/2ML
125 INJECTION, POWDER, LYOPHILIZED, FOR SOLUTION INTRAMUSCULAR; INTRAVENOUS
Status: DISCONTINUED | OUTPATIENT
Start: 2024-08-22 | End: 2024-08-22 | Stop reason: HOSPADM

## 2024-08-22 RX ORDER — EPINEPHRINE 1 MG/ML
0.3 INJECTION, SOLUTION INTRAMUSCULAR; SUBCUTANEOUS EVERY 5 MIN PRN
Status: CANCELLED | OUTPATIENT
Start: 2024-09-05

## 2024-08-22 RX ORDER — DIPHENHYDRAMINE HYDROCHLORIDE 50 MG/ML
50 INJECTION INTRAMUSCULAR; INTRAVENOUS
Status: DISCONTINUED | OUTPATIENT
Start: 2024-08-22 | End: 2024-08-22 | Stop reason: HOSPADM

## 2024-08-22 RX ORDER — METHYLPREDNISOLONE SODIUM SUCCINATE 125 MG/2ML
125 INJECTION, POWDER, LYOPHILIZED, FOR SOLUTION INTRAMUSCULAR; INTRAVENOUS
Status: CANCELLED
Start: 2024-09-05

## 2024-08-22 RX ORDER — MEPERIDINE HYDROCHLORIDE 25 MG/ML
25 INJECTION INTRAMUSCULAR; INTRAVENOUS; SUBCUTANEOUS EVERY 30 MIN PRN
Status: CANCELLED | OUTPATIENT
Start: 2024-09-05

## 2024-08-22 RX ADMIN — DARBEPOETIN ALFA 60 MCG: 60 INJECTION, SOLUTION INTRAVENOUS; SUBCUTANEOUS at 11:45

## 2024-08-22 ASSESSMENT — PAIN SCALES - GENERAL: PAINLEVEL: MODERATE PAIN (5)

## 2024-08-22 NOTE — PROGRESS NOTES
Infusion Nursing Note:  [facility]    Patient seen by provider today: No   present during visit today: Not Applicable.    Note: Assessment performed by Gianna MCGOVERN RN prior to injection today. .      Intravenous Access:  No Intravenous access/labs at this visit.    Treatment Conditions:  Lab Results   Component Value Date    HGB 10.4 (L) 08/22/2024    WBC 7.4 07/22/2024    ANEU 2.9 01/14/2016     07/22/2024        Results reviewed, labs MET treatment parameters, ok to proceed with treatment.      Post Infusion Assessment:  Patient tolerated injection without incident.  Site patent and intact, free from redness, edema or discomfort.       Discharge Plan:   Departure Mode: Ambulatory.      Samantha Tobar MA

## 2024-09-05 ENCOUNTER — LAB (OUTPATIENT)
Dept: INFUSION THERAPY | Facility: CLINIC | Age: 59
End: 2024-09-05
Attending: INTERNAL MEDICINE
Payer: MEDICARE

## 2024-09-05 ENCOUNTER — PATIENT OUTREACH (OUTPATIENT)
Dept: CARE COORDINATION | Facility: CLINIC | Age: 59
End: 2024-09-05

## 2024-09-05 VITALS
DIASTOLIC BLOOD PRESSURE: 79 MMHG | SYSTOLIC BLOOD PRESSURE: 124 MMHG | HEART RATE: 68 BPM | TEMPERATURE: 97.8 F | OXYGEN SATURATION: 97 %

## 2024-09-05 DIAGNOSIS — D63.1 ANEMIA OF CHRONIC RENAL FAILURE, STAGE 4 (SEVERE) (H): Primary | ICD-10-CM

## 2024-09-05 DIAGNOSIS — N18.4 ANEMIA OF CHRONIC RENAL FAILURE, STAGE 4 (SEVERE) (H): Primary | ICD-10-CM

## 2024-09-05 LAB
HCT VFR BLD AUTO: 34 % (ref 35–47)
HGB BLD-MCNC: 10.9 G/DL (ref 11.7–15.7)
HOLD SPECIMEN: NORMAL
HOLD SPECIMEN: NORMAL

## 2024-09-05 PROCEDURE — 36415 COLL VENOUS BLD VENIPUNCTURE: CPT | Performed by: INTERNAL MEDICINE

## 2024-09-05 PROCEDURE — 96372 THER/PROPH/DIAG INJ SC/IM: CPT | Performed by: INTERNAL MEDICINE

## 2024-09-05 PROCEDURE — 85018 HEMOGLOBIN: CPT | Performed by: INTERNAL MEDICINE

## 2024-09-05 PROCEDURE — 85014 HEMATOCRIT: CPT | Performed by: INTERNAL MEDICINE

## 2024-09-05 PROCEDURE — 250N000011 HC RX IP 250 OP 636: Performed by: INTERNAL MEDICINE

## 2024-09-05 PROCEDURE — 99207 PR NO CHARGE LOS: CPT

## 2024-09-05 RX ORDER — DIPHENHYDRAMINE HYDROCHLORIDE 50 MG/ML
50 INJECTION INTRAMUSCULAR; INTRAVENOUS
Status: CANCELLED
Start: 2024-09-19

## 2024-09-05 RX ORDER — ALBUTEROL SULFATE 0.83 MG/ML
2.5 SOLUTION RESPIRATORY (INHALATION)
Status: CANCELLED | OUTPATIENT
Start: 2024-09-19

## 2024-09-05 RX ORDER — ALBUTEROL SULFATE 90 UG/1
1-2 AEROSOL, METERED RESPIRATORY (INHALATION)
Status: CANCELLED
Start: 2024-09-19

## 2024-09-05 RX ORDER — METHYLPREDNISOLONE SODIUM SUCCINATE 125 MG/2ML
125 INJECTION, POWDER, LYOPHILIZED, FOR SOLUTION INTRAMUSCULAR; INTRAVENOUS
Status: CANCELLED
Start: 2024-09-19

## 2024-09-05 RX ORDER — EPINEPHRINE 1 MG/ML
0.3 INJECTION, SOLUTION INTRAMUSCULAR; SUBCUTANEOUS EVERY 5 MIN PRN
Status: CANCELLED | OUTPATIENT
Start: 2024-09-19

## 2024-09-05 RX ORDER — MEPERIDINE HYDROCHLORIDE 25 MG/ML
25 INJECTION INTRAMUSCULAR; INTRAVENOUS; SUBCUTANEOUS EVERY 30 MIN PRN
Status: CANCELLED | OUTPATIENT
Start: 2024-09-19

## 2024-09-05 RX ADMIN — DARBEPOETIN ALFA 60 MCG: 60 INJECTION, SOLUTION INTRAVENOUS; SUBCUTANEOUS at 10:31

## 2024-09-05 ASSESSMENT — PAIN SCALES - GENERAL: PAINLEVEL: SEVERE PAIN (6)

## 2024-09-05 NOTE — PROGRESS NOTES
Anemia Management Note - Follow Up      SUBJECTIVE/OBJECTIVE:      Referred by Dr. Nathan Blood on 2024  Primary Diagnosis: Anemia in Chronic Kidney Disease (N18.4, D63.1)     Secondary Diagnosis:  Chronic Kidney Disease, Stage 4 (N18.4)   Hgb goal range:  10-11 *range increased on 24 per Dr. Blood     Epo/Darbo: Aranesp 60mcg every 14 days for Hgb <11.0. In Clinic.   Iron regimen:  Treat with IV Iron as needed.     *ok to stop oral Iron.  No improvement in Iron levels with Oral Iron.   *completed Injectafer on 24.      Labs : 2025  RX/TX plans : 7/10/2025     *staying in [place].      No history of stroke, MI, and blood clots or cancers.  Recent BRANDON use, transfusion, IV iron: NA     Contact:            No Consent to Communicate on File.         Latest Ref Rng & Units 2024   Anemia   HGB Goal     10 - 11  10 - 11 10 - 11   BRANDON Dose     60 mcg  60 mcg 60 mcg   Hemoglobin 11.7 - 15.7 g/dL 10.3  10.5  9.8  10.3  11.0  10.4  10.9    TSAT 15 - 46 % 37  44    46      Ferritin 11 - 328 ng/mL 776  699    674           BP Readings from Last 3 Encounters:   24 124/79   24 123/77   24 130/79     Wt Readings from Last 2 Encounters:   24 88.9 kg (196 lb)   24 88 kg (194 lb)           ASSESSMENT:    Hgb:at goal - received dose in clinic - recommend continue current regimen  TSat: at goal >30% Ferritin: At goal (>100ng/mL)        PLAN:  Dose with Aranesp.  RTC for hgb then Aranesp if needed in 2 week(s). Aranesp is schedule for  & 10/4.     Orders needed to be renewed (for next follow-up date) in EPIC: None    Iron labs due:  Due    Plan discussed with:  No call, chart review       NEXT FOLLOW-UP DATE:  10/4/24    Archana Salamanca RN   Anemia Services  [facility]  [email or web address]   Office : [phone]  Fax: [phone]

## 2024-09-05 NOTE — PROGRESS NOTES
Infusion Nursing Note:  [facility]    Patient seen by provider today: No   present during visit today: Not Applicable.    Note: Assessment performed by Tea CATHERINE RN prior to injection today. .      Intravenous Access:  No Intravenous access/labs at this visit.    Treatment Conditions:  Lab Results   Component Value Date    HGB 10.9 (L) 09/05/2024    WBC 7.4 07/22/2024    ANEU 2.9 01/14/2016     07/22/2024        Results reviewed, labs MET treatment parameters, ok to proceed with treatment.      Post Infusion Assessment:  Patient tolerated injection without incident.  Site patent and intact, free from redness, edema or discomfort.       Discharge Plan:   Patient discharged in stable condition accompanied by: self.  Departure Mode: Ambulatory.      Samantha Tobar MA

## 2024-09-20 ENCOUNTER — INFUSION THERAPY VISIT (OUTPATIENT)
Dept: INFUSION THERAPY | Facility: CLINIC | Age: 59
End: 2024-09-20
Attending: INTERNAL MEDICINE
Payer: MEDICARE

## 2024-09-20 VITALS
OXYGEN SATURATION: 95 % | SYSTOLIC BLOOD PRESSURE: 124 MMHG | RESPIRATION RATE: 16 BRPM | HEART RATE: 65 BPM | DIASTOLIC BLOOD PRESSURE: 75 MMHG

## 2024-09-20 DIAGNOSIS — D63.1 ANEMIA OF CHRONIC RENAL FAILURE, STAGE 4 (SEVERE) (H): ICD-10-CM

## 2024-09-20 DIAGNOSIS — N18.4 ANEMIA OF CHRONIC RENAL FAILURE, STAGE 4 (SEVERE) (H): ICD-10-CM

## 2024-09-20 DIAGNOSIS — D63.1 ANEMIA OF CHRONIC RENAL FAILURE, STAGE 4 (SEVERE) (H): Primary | ICD-10-CM

## 2024-09-20 DIAGNOSIS — N18.4 CKD (CHRONIC KIDNEY DISEASE) STAGE 4, GFR 15-29 ML/MIN (H): ICD-10-CM

## 2024-09-20 DIAGNOSIS — N18.4 ANEMIA OF CHRONIC RENAL FAILURE, STAGE 4 (SEVERE) (H): Primary | ICD-10-CM

## 2024-09-20 LAB
FERRITIN SERPL-MCNC: 435 NG/ML (ref 11–328)
HCT VFR BLD AUTO: 33.9 % (ref 35–47)
HGB BLD-MCNC: 10.8 G/DL (ref 11.7–15.7)
IRON BINDING CAPACITY (ROCHE): 171 UG/DL (ref 240–430)
IRON SATN MFR SERPL: 30 % (ref 15–46)
IRON SERPL-MCNC: 51 UG/DL (ref 37–145)

## 2024-09-20 PROCEDURE — 99207 PR NO CHARGE LOS: CPT

## 2024-09-20 PROCEDURE — 83550 IRON BINDING TEST: CPT

## 2024-09-20 PROCEDURE — 85014 HEMATOCRIT: CPT

## 2024-09-20 PROCEDURE — 250N000011 HC RX IP 250 OP 636: Performed by: INTERNAL MEDICINE

## 2024-09-20 PROCEDURE — 36415 COLL VENOUS BLD VENIPUNCTURE: CPT

## 2024-09-20 PROCEDURE — 82728 ASSAY OF FERRITIN: CPT

## 2024-09-20 PROCEDURE — 96372 THER/PROPH/DIAG INJ SC/IM: CPT | Performed by: INTERNAL MEDICINE

## 2024-09-20 RX ORDER — DIPHENHYDRAMINE HYDROCHLORIDE 50 MG/ML
50 INJECTION INTRAMUSCULAR; INTRAVENOUS
Start: 2024-10-03

## 2024-09-20 RX ORDER — MEPERIDINE HYDROCHLORIDE 25 MG/ML
25 INJECTION INTRAMUSCULAR; INTRAVENOUS; SUBCUTANEOUS EVERY 30 MIN PRN
OUTPATIENT
Start: 2024-10-03

## 2024-09-20 RX ORDER — EPINEPHRINE 1 MG/ML
0.3 INJECTION, SOLUTION INTRAMUSCULAR; SUBCUTANEOUS EVERY 5 MIN PRN
OUTPATIENT
Start: 2024-10-03

## 2024-09-20 RX ORDER — METHYLPREDNISOLONE SODIUM SUCCINATE 125 MG/2ML
125 INJECTION, POWDER, LYOPHILIZED, FOR SOLUTION INTRAMUSCULAR; INTRAVENOUS
Start: 2024-10-03

## 2024-09-20 RX ORDER — ALBUTEROL SULFATE 90 UG/1
1-2 AEROSOL, METERED RESPIRATORY (INHALATION)
Start: 2024-10-03

## 2024-09-20 RX ORDER — ALBUTEROL SULFATE 0.83 MG/ML
2.5 SOLUTION RESPIRATORY (INHALATION)
OUTPATIENT
Start: 2024-10-03

## 2024-09-20 RX ADMIN — DARBEPOETIN ALFA 60 MCG: 60 INJECTION, SOLUTION INTRAVENOUS; SUBCUTANEOUS at 12:28

## 2024-09-20 NOTE — PROGRESS NOTES
Infusion Nursing Note:  Evelyn SHANIQUA Cisneros presents today for Aranesp.    Patient seen by provider today: No   present during visit today: Not Applicable.    Note: Assessment performed by Tea MCGOVERN RN prior to injection today.    Intravenous Access:  No Intravenous access/labs at this visit.    Treatment Conditions:  Lab Results   Component Value Date    HGB 10.8 (L) 09/20/2024    WBC 7.4 07/22/2024    ANEU 2.9 01/14/2016     07/22/2024        Results reviewed, labs MET treatment parameters, ok to proceed with treatment.      Post Infusion Assessment:  Patient tolerated injection without incident.  Site patent and intact, free from redness, edema or discomfort.       Discharge Plan:   Patient discharged in stable condition accompanied by: self.  Departure Mode: Ambulatory.      Melina Cunningham LPN

## 2024-10-01 ENCOUNTER — LAB (OUTPATIENT)
Dept: LAB | Facility: CLINIC | Age: 59
End: 2024-10-01
Payer: MEDICARE

## 2024-10-01 DIAGNOSIS — N18.4 CKD STAGE 4 SECONDARY TO HYPERTENSION (H): ICD-10-CM

## 2024-10-01 DIAGNOSIS — D63.1 ANEMIA OF CHRONIC RENAL FAILURE, STAGE 4 (SEVERE) (H): ICD-10-CM

## 2024-10-01 DIAGNOSIS — N18.4 CKD (CHRONIC KIDNEY DISEASE) STAGE 4, GFR 15-29 ML/MIN (H): ICD-10-CM

## 2024-10-01 DIAGNOSIS — N18.4 ANEMIA OF CHRONIC RENAL FAILURE, STAGE 4 (SEVERE) (H): ICD-10-CM

## 2024-10-01 DIAGNOSIS — I12.9 CKD STAGE 4 SECONDARY TO HYPERTENSION (H): ICD-10-CM

## 2024-10-01 LAB
ALBUMIN SERPL BCG-MCNC: 3.9 G/DL (ref 3.5–5.2)
ALBUMIN UR-MCNC: NEGATIVE MG/DL
ANION GAP SERPL CALCULATED.3IONS-SCNC: 12 MMOL/L (ref 7–15)
APPEARANCE UR: ABNORMAL
BACTERIA #/AREA URNS HPF: ABNORMAL /HPF
BILIRUB UR QL STRIP: NEGATIVE
BUN SERPL-MCNC: 22.1 MG/DL (ref 8–23)
CALCIUM SERPL-MCNC: 8.9 MG/DL (ref 8.8–10.4)
CHLORIDE SERPL-SCNC: 106 MMOL/L (ref 98–107)
COLOR UR AUTO: YELLOW
CREAT SERPL-MCNC: 2.12 MG/DL (ref 0.51–0.95)
CREAT UR-MCNC: 39.7 MG/DL
EGFRCR SERPLBLD CKD-EPI 2021: 26 ML/MIN/1.73M2
ERYTHROCYTE [DISTWIDTH] IN BLOOD BY AUTOMATED COUNT: 13.2 % (ref 10–15)
GLUCOSE SERPL-MCNC: 170 MG/DL (ref 70–99)
GLUCOSE UR STRIP-MCNC: >=1000 MG/DL
HCO3 SERPL-SCNC: 23 MMOL/L (ref 22–29)
HCT VFR BLD AUTO: 36 % (ref 35–47)
HGB BLD-MCNC: 11.4 G/DL (ref 11.7–15.7)
HGB UR QL STRIP: ABNORMAL
KETONES UR STRIP-MCNC: NEGATIVE MG/DL
LEUKOCYTE ESTERASE UR QL STRIP: ABNORMAL
MCH RBC QN AUTO: 30.4 PG (ref 26.5–33)
MCHC RBC AUTO-ENTMCNC: 31.7 G/DL (ref 31.5–36.5)
MCV RBC AUTO: 96 FL (ref 78–100)
MICROALBUMIN UR-MCNC: 29.2 MG/L
MICROALBUMIN/CREAT UR: 73.55 MG/G CR (ref 0–25)
NITRATE UR QL: POSITIVE
PH UR STRIP: 6 [PH] (ref 5–7)
PHOSPHATE SERPL-MCNC: 4.6 MG/DL (ref 2.5–4.5)
PLATELET # BLD AUTO: 178 10E3/UL (ref 150–450)
POTASSIUM SERPL-SCNC: 3.6 MMOL/L (ref 3.4–5.3)
PTH-INTACT SERPL-MCNC: 81 PG/ML (ref 15–65)
RBC # BLD AUTO: 3.75 10E6/UL (ref 3.8–5.2)
RBC #/AREA URNS AUTO: ABNORMAL /HPF
SODIUM SERPL-SCNC: 141 MMOL/L (ref 135–145)
SP GR UR STRIP: 1.01 (ref 1–1.03)
SQUAMOUS #/AREA URNS AUTO: ABNORMAL /LPF
UROBILINOGEN UR STRIP-ACNC: 0.2 E.U./DL
VIT D+METAB SERPL-MCNC: 68 NG/ML (ref 20–50)
WBC # BLD AUTO: 5.1 10E3/UL (ref 4–11)
WBC #/AREA URNS AUTO: ABNORMAL /HPF

## 2024-10-01 PROCEDURE — 83970 ASSAY OF PARATHORMONE: CPT

## 2024-10-01 PROCEDURE — 85027 COMPLETE CBC AUTOMATED: CPT

## 2024-10-01 PROCEDURE — 36415 COLL VENOUS BLD VENIPUNCTURE: CPT

## 2024-10-01 PROCEDURE — 82043 UR ALBUMIN QUANTITATIVE: CPT

## 2024-10-01 PROCEDURE — 81001 URINALYSIS AUTO W/SCOPE: CPT

## 2024-10-01 PROCEDURE — 82306 VITAMIN D 25 HYDROXY: CPT

## 2024-10-01 PROCEDURE — 82570 ASSAY OF URINE CREATININE: CPT

## 2024-10-01 PROCEDURE — 80069 RENAL FUNCTION PANEL: CPT

## 2024-10-04 ENCOUNTER — PATIENT OUTREACH (OUTPATIENT)
Dept: CARE COORDINATION | Facility: CLINIC | Age: 59
End: 2024-10-04

## 2024-10-04 ENCOUNTER — INFUSION THERAPY VISIT (OUTPATIENT)
Dept: INFUSION THERAPY | Facility: CLINIC | Age: 59
End: 2024-10-04
Attending: INTERNAL MEDICINE
Payer: MEDICARE

## 2024-10-04 DIAGNOSIS — D63.1 ANEMIA OF CHRONIC RENAL FAILURE, STAGE 4 (SEVERE) (H): ICD-10-CM

## 2024-10-04 DIAGNOSIS — N18.4 ANEMIA OF CHRONIC RENAL FAILURE, STAGE 4 (SEVERE) (H): ICD-10-CM

## 2024-10-04 DIAGNOSIS — N18.4 CKD (CHRONIC KIDNEY DISEASE) STAGE 4, GFR 15-29 ML/MIN (H): ICD-10-CM

## 2024-10-04 LAB
HCT VFR BLD AUTO: 35.4 % (ref 35–47)
HGB BLD-MCNC: 11.2 G/DL (ref 11.7–15.7)
HOLD SPECIMEN: NORMAL
HOLD SPECIMEN: NORMAL

## 2024-10-04 PROCEDURE — 85014 HEMATOCRIT: CPT

## 2024-10-04 PROCEDURE — 99207 PR NO CHARGE LOS: CPT

## 2024-10-04 PROCEDURE — 36415 COLL VENOUS BLD VENIPUNCTURE: CPT

## 2024-10-04 NOTE — PROGRESS NOTES
Infusion Nursing Note:  Evelyn Uriosteguimariana presents today for Aranep- NOT NEEDED.    Patient seen by provider today: No   present during visit today: Not Applicable.    Note: N/A.      Intravenous Access:  No Intravenous access/labs at this visit.    Treatment Conditions:  Lab Results   Component Value Date    HGB 11.2 (L) 10/04/2024    WBC 5.1 10/01/2024    ANEU 2.9 01/14/2016     10/01/2024        Results reviewed, labs did NOT meet treatment parameters: Hgb > 11.0      Post Infusion Assessment:  Not applicable.       Discharge Plan:   Patient discharged in stable condition accompanied by: self.  Departure Mode: Ambulatory.  Already has next appts.      Melina Cunningham LPN

## 2024-10-04 NOTE — PROGRESS NOTES
Anemia Management Note - Follow Up      SUBJECTIVE/OBJECTIVE:    Referred by Dr. Nathan Blood on 2024  Primary Diagnosis: Anemia in Chronic Kidney Disease (N18.4, D63.1)     Secondary Diagnosis:  Chronic Kidney Disease, Stage 4 (N18.4)   Hgb goal range:  10-11 *range increased on 24 per Dr. Blood     Epo/Darbo: Aranesp 60mcg every 14 days for Hgb <11.0. In Clinic.   Iron regimen:  Treat with IV Iron as needed.     *ok to stop oral Iron.  No improvement in Iron levels with Oral Iron.   *completed Injectafer on 24.      Labs : 2025  RX/TX plans : 7/10/2025     *staying in [place].      No history of stroke, MI, and blood clots or cancers.  Recent BRANDON use, transfusion, IV iron: NA     Contact:            No Consent to Communicate on File.            Latest Ref Rng & Units 2024 2024 2024 2024 2024 10/1/2024 10/4/2024   Anemia   HGB Goal  10 - 11  10 - 11 10 - 11 10 - 11     BRANDON Dose  60 mcg  60 mcg 60 mcg 60 mcg     Hemoglobin 11.7 - 15.7 g/dL 10.3  11.0  10.4  10.9  10.8  11.4  11.2    TSAT 15 - 46 %  46    30      Ferritin 11 - 328 ng/mL  674    435           BP Readings from Last 3 Encounters:   24 124/75   24 124/79   24 123/77     Wt Readings from Last 2 Encounters:   24 88.9 kg (196 lb)   24 88 kg (194 lb)           ASSESSMENT:    Hgb:Above goal - recommend hold dose  TSat: at goal >30% Ferritin: At goal (>100ng/mL)        PLAN:  Hold Aranesp.  RTC for hgb then Aranesp if needed in 2 week(s).  Aranesp is scheduled for 10/17 & 10/31.     Orders needed to be renewed (for next follow-up date) in EPIC: None    Iron labs due:  End of Oct 2024    Plan discussed with:  No call, chart review       NEXT FOLLOW-UP DATE:  10/31/24    Archana Salamanca RN   Anemia Services  [facility]  [email or web address]   Office : [phone]  Fax: [phone]
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
## 2024-10-07 ENCOUNTER — OFFICE VISIT (OUTPATIENT)
Dept: NEPHROLOGY | Facility: CLINIC | Age: 59
End: 2024-10-07
Payer: MEDICARE

## 2024-10-07 VITALS — DIASTOLIC BLOOD PRESSURE: 73 MMHG | WEIGHT: 188 LBS | SYSTOLIC BLOOD PRESSURE: 148 MMHG | HEART RATE: 74 BPM

## 2024-10-07 DIAGNOSIS — R80.9 ALBUMINURIA: ICD-10-CM

## 2024-10-07 DIAGNOSIS — N18.4 CKD STAGE 4 SECONDARY TO HYPERTENSION (H): Primary | ICD-10-CM

## 2024-10-07 DIAGNOSIS — I10 HYPERTENSION, ESSENTIAL: ICD-10-CM

## 2024-10-07 DIAGNOSIS — N18.4 ANEMIA IN STAGE 4 CHRONIC KIDNEY DISEASE (H): ICD-10-CM

## 2024-10-07 DIAGNOSIS — I12.9 CKD STAGE 4 SECONDARY TO HYPERTENSION (H): Primary | ICD-10-CM

## 2024-10-07 DIAGNOSIS — E67.3 HYPERVITAMINOSIS D: ICD-10-CM

## 2024-10-07 DIAGNOSIS — D63.1 ANEMIA IN STAGE 4 CHRONIC KIDNEY DISEASE (H): ICD-10-CM

## 2024-10-07 PROCEDURE — 99214 OFFICE O/P EST MOD 30 MIN: CPT | Performed by: PHYSICIAN ASSISTANT

## 2024-10-07 NOTE — PATIENT INSTRUCTIONS
Stop vitamin D. Will recheck this and your PTH in 3-6 months  Avoid ibuprofen/aleve.   Check blood pressure daily, keep log.   Work on improving blood sugars to help control the amount of protein/albumin in the urine.   Contact your PCP regarding your difficulty swallowing.   Labs and follow up in 3 months.

## 2024-10-10 ENCOUNTER — TELEPHONE (OUTPATIENT)
Dept: NEPHROLOGY | Facility: CLINIC | Age: 59
End: 2024-10-10
Payer: MEDICARE

## 2024-10-10 NOTE — TELEPHONE ENCOUNTER
[facility] Call Center    Phone Message    May a detailed message be left on voicemail: yes     Reason for Call: Medication Question or concern regarding medication   Prescription Clarification  Name of Medication: conjugated estrogens (PREMARIN) 0.625 MG/GM vaginal cream  Prescribing Provider: Nathan Blood MD   Pharmacy: [facility] [id number]    What on the order needs clarification? Pharmacy is calling saying the patient was told she was going to be taking this medication 3x/week. But script is only for 2x/week. Please send new script to pharmacy.      Action Taken: Other: Mg - Nephrology    Travel Screening: Not Applicable     Date of Service:

## 2024-10-10 NOTE — TELEPHONE ENCOUNTER
[facility] Call Center    Phone Message    May a detailed message be left on voicemail: yes     Reason for Call: Other: Pharmacy called with a question about the patient taking a vitamin D supplement . Pharmacy states he sees this RX was discontinued by provider but the pharmacy has a active order and the pharmacist would like to know if he should give this patient the Rx or not .PLease call Chad at [facility] in [place] [phone]    Action Taken: Other: [facility]    Travel Screening: Not Applicable     Date of Service:

## 2024-10-10 NOTE — TELEPHONE ENCOUNTER
Per Barb Helms PA-C 10/7/24. Stop vitamin D. Will recheck this and your PTH in 3-6 months.    Pharmacy notified.

## 2024-10-11 NOTE — TELEPHONE ENCOUNTER
Contacted pt. She stated that this was to come from her urology team. Will contact pharmacy and let them know this.

## 2024-10-17 ENCOUNTER — LAB (OUTPATIENT)
Dept: INFUSION THERAPY | Facility: CLINIC | Age: 59
End: 2024-10-17
Attending: INTERNAL MEDICINE
Payer: MEDICARE

## 2024-10-17 DIAGNOSIS — N18.4 CKD (CHRONIC KIDNEY DISEASE) STAGE 4, GFR 15-29 ML/MIN (H): ICD-10-CM

## 2024-10-17 DIAGNOSIS — D63.1 ANEMIA OF CHRONIC RENAL FAILURE, STAGE 4 (SEVERE) (H): ICD-10-CM

## 2024-10-17 DIAGNOSIS — N18.4 ANEMIA OF CHRONIC RENAL FAILURE, STAGE 4 (SEVERE) (H): ICD-10-CM

## 2024-10-17 LAB
FERRITIN SERPL-MCNC: 388 NG/ML (ref 11–328)
HCT VFR BLD AUTO: 35.3 % (ref 35–47)
HGB BLD-MCNC: 11.2 G/DL (ref 11.7–15.7)
HOLD SPECIMEN: NORMAL
IRON BINDING CAPACITY (ROCHE): 198 UG/DL (ref 240–430)
IRON SATN MFR SERPL: 36 % (ref 15–46)
IRON SERPL-MCNC: 72 UG/DL (ref 37–145)

## 2024-10-17 PROCEDURE — 36415 COLL VENOUS BLD VENIPUNCTURE: CPT

## 2024-10-17 PROCEDURE — 82728 ASSAY OF FERRITIN: CPT

## 2024-10-17 PROCEDURE — 85014 HEMATOCRIT: CPT

## 2024-10-17 PROCEDURE — 99207 PR NO CHARGE LOS: CPT

## 2024-10-17 PROCEDURE — 83550 IRON BINDING TEST: CPT

## 2024-10-28 RX ORDER — DIPHENHYDRAMINE HYDROCHLORIDE 50 MG/ML
25 INJECTION INTRAMUSCULAR; INTRAVENOUS
Start: 2024-10-28

## 2024-10-28 RX ORDER — EPINEPHRINE 1 MG/ML
0.3 INJECTION, SOLUTION, CONCENTRATE INTRAVENOUS EVERY 5 MIN PRN
OUTPATIENT
Start: 2024-10-28

## 2024-10-28 RX ORDER — DIPHENHYDRAMINE HYDROCHLORIDE 50 MG/ML
50 INJECTION INTRAMUSCULAR; INTRAVENOUS
Start: 2024-10-28

## 2024-10-28 RX ORDER — ALBUTEROL SULFATE 90 UG/1
1-2 INHALANT RESPIRATORY (INHALATION)
Start: 2024-10-28

## 2024-10-28 RX ORDER — MEPERIDINE HYDROCHLORIDE 25 MG/ML
25 INJECTION INTRAMUSCULAR; INTRAVENOUS; SUBCUTANEOUS
OUTPATIENT
Start: 2024-10-28

## 2024-10-28 RX ORDER — METHYLPREDNISOLONE SODIUM SUCCINATE 40 MG/ML
40 INJECTION INTRAMUSCULAR; INTRAVENOUS
Start: 2024-10-28

## 2024-10-28 RX ORDER — ALBUTEROL SULFATE 0.83 MG/ML
2.5 SOLUTION RESPIRATORY (INHALATION)
OUTPATIENT
Start: 2024-10-28

## 2024-10-31 ENCOUNTER — PATIENT OUTREACH (OUTPATIENT)
Dept: CARE COORDINATION | Facility: CLINIC | Age: 59
End: 2024-10-31

## 2024-10-31 ENCOUNTER — LAB (OUTPATIENT)
Dept: INFUSION THERAPY | Facility: CLINIC | Age: 59
End: 2024-10-31
Attending: INTERNAL MEDICINE
Payer: MEDICARE

## 2024-10-31 DIAGNOSIS — D63.1 ANEMIA OF CHRONIC RENAL FAILURE, STAGE 4 (SEVERE) (H): ICD-10-CM

## 2024-10-31 DIAGNOSIS — N18.4 ANEMIA OF CHRONIC RENAL FAILURE, STAGE 4 (SEVERE) (H): ICD-10-CM

## 2024-10-31 DIAGNOSIS — N18.4 ANEMIA OF CHRONIC RENAL FAILURE, STAGE 4 (SEVERE) (H): Primary | ICD-10-CM

## 2024-10-31 DIAGNOSIS — D63.1 ANEMIA OF CHRONIC RENAL FAILURE, STAGE 4 (SEVERE) (H): Primary | ICD-10-CM

## 2024-10-31 LAB
HCT VFR BLD AUTO: 35.8 % (ref 35–47)
HGB BLD-MCNC: 11.3 G/DL (ref 11.7–15.7)
HOLD SPECIMEN: NORMAL
HOLD SPECIMEN: NORMAL

## 2024-10-31 PROCEDURE — 85014 HEMATOCRIT: CPT | Performed by: INTERNAL MEDICINE

## 2024-10-31 PROCEDURE — 85018 HEMOGLOBIN: CPT | Performed by: INTERNAL MEDICINE

## 2024-10-31 PROCEDURE — 36415 COLL VENOUS BLD VENIPUNCTURE: CPT | Performed by: INTERNAL MEDICINE

## 2024-10-31 PROCEDURE — 99207 PR NO CHARGE LOS: CPT

## 2024-10-31 RX ORDER — METHYLPREDNISOLONE SODIUM SUCCINATE 40 MG/ML
40 INJECTION INTRAMUSCULAR; INTRAVENOUS
Start: 2024-11-01

## 2024-10-31 RX ORDER — DIPHENHYDRAMINE HYDROCHLORIDE 50 MG/ML
25 INJECTION INTRAMUSCULAR; INTRAVENOUS
Start: 2024-11-01

## 2024-10-31 RX ORDER — DIPHENHYDRAMINE HYDROCHLORIDE 50 MG/ML
50 INJECTION INTRAMUSCULAR; INTRAVENOUS
Start: 2024-11-01

## 2024-10-31 RX ORDER — ALBUTEROL SULFATE 90 UG/1
1-2 INHALANT RESPIRATORY (INHALATION)
Start: 2024-11-01

## 2024-10-31 RX ORDER — MEPERIDINE HYDROCHLORIDE 25 MG/ML
25 INJECTION INTRAMUSCULAR; INTRAVENOUS; SUBCUTANEOUS
OUTPATIENT
Start: 2024-11-01

## 2024-10-31 RX ORDER — EPINEPHRINE 1 MG/ML
0.3 INJECTION, SOLUTION INTRAMUSCULAR; SUBCUTANEOUS EVERY 5 MIN PRN
OUTPATIENT
Start: 2024-11-01

## 2024-10-31 RX ORDER — ALBUTEROL SULFATE 0.83 MG/ML
2.5 SOLUTION RESPIRATORY (INHALATION)
OUTPATIENT
Start: 2024-11-01

## 2024-10-31 NOTE — PROGRESS NOTES
Anemia Management Note - Follow Up      SUBJECTIVE/OBJECTIVE:    Referred by Dr. Nathan Blood on 2024  Primary Diagnosis: Anemia in Chronic Kidney Disease (N18.4, D63.1)     Secondary Diagnosis:  Chronic Kidney Disease, Stage 4 (N18.4)   Hgb goal range:  10-11 *range increased on 24 per Dr. Blood     Epo/Darbo: Aranesp 60mcg every 14 days for Hgb <11.0. In Clinic.   Iron regimen:  Treat with IV Iron as needed.     *ok to stop oral Iron.  No improvement in Iron levels with Oral Iron.   *completed Injectafer on 24.      Labs : 2025  RX/TX plans : 7/10/2025     *staying in [place].      No history of stroke, MI, and blood clots or cancers.  Recent BRANDON use, transfusion, IV iron: NA     Contact:            No Consent to Communicate on File.         Latest Ref Rng & Units 2024 2024 2024 10/1/2024 10/4/2024 10/17/2024 10/31/2024   Anemia   HGB Goal  10 - 11 10 - 11 10 - 11       BRANDON Dose  60 mcg 60 mcg 60 mcg       Hemoglobin 11.7 - 15.7 g/dL 10.4  10.9  10.8  11.4  11.2  11.2  11.3    TSAT 15 - 46 %   30    36     Ferritin 11 - 328 ng/mL   435    388          BP Readings from Last 3 Encounters:   10/07/24 (!) 148/73   24 124/75   24 124/79     Wt Readings from Last 2 Encounters:   10/07/24 85.3 kg (188 lb)   24 88.9 kg (196 lb)           ASSESSMENT:    Hgb:Above goal - recommend hold dose  TSat: at goal >30% Ferritin: At goal (>100ng/mL)        PLAN:  Hold Aranesp.  RTC for hgb then Aranesp if needed in 2 week(s). Discussed with Evelyn the option of going in for Aranesp monthly.  For right now, she prefers to keep the every 2 week visits.     Orders needed to be renewed (for next follow-up date) in EPIC: None    Iron labs due:  Mid 2024    Plan discussed with:  Evelyn       NEXT FOLLOW-UP DATE:  24    Archana Salamanca RN   Anemia Services  [facility]  [email or web address]   Office : [phone]  Fax: [phone]

## 2024-10-31 NOTE — PROGRESS NOTES
Infusion Nursing Note:  Evelyn SHANIQUA Cisneros presents today for [facility].    Patient seen by provider today: No   present during visit today: Not Applicable.    Note: N/A.      Intravenous Access:  No Intravenous access/labs at this visit.    Treatment Conditions:  Lab Results   Component Value Date    HGB 11.3 (L) 10/31/2024    WBC 5.1 10/01/2024    ANEU 2.9 01/14/2016     10/01/2024        Results reviewed, labs did NOT meet treatment parameters: Paraments less than 11.      Post Infusion Assessment:  N/A.       Discharge Plan:   N/A.      Samantha Tobar MA

## 2024-11-03 ENCOUNTER — HEALTH MAINTENANCE LETTER (OUTPATIENT)
Age: 59
End: 2024-11-03

## 2024-11-14 ENCOUNTER — PATIENT OUTREACH (OUTPATIENT)
Dept: CARE COORDINATION | Facility: CLINIC | Age: 59
End: 2024-11-14

## 2024-11-14 ENCOUNTER — INFUSION THERAPY VISIT (OUTPATIENT)
Dept: INFUSION THERAPY | Facility: CLINIC | Age: 59
End: 2024-11-14
Attending: INTERNAL MEDICINE
Payer: MEDICARE

## 2024-11-14 VITALS
RESPIRATION RATE: 18 BRPM | OXYGEN SATURATION: 97 % | TEMPERATURE: 98.1 F | DIASTOLIC BLOOD PRESSURE: 82 MMHG | SYSTOLIC BLOOD PRESSURE: 137 MMHG | HEART RATE: 71 BPM

## 2024-11-14 DIAGNOSIS — N18.4 CKD (CHRONIC KIDNEY DISEASE) STAGE 4, GFR 15-29 ML/MIN (H): ICD-10-CM

## 2024-11-14 DIAGNOSIS — D63.1 ANEMIA OF CHRONIC RENAL FAILURE, STAGE 4 (SEVERE) (H): Primary | ICD-10-CM

## 2024-11-14 DIAGNOSIS — N18.4 ANEMIA OF CHRONIC RENAL FAILURE, STAGE 4 (SEVERE) (H): ICD-10-CM

## 2024-11-14 DIAGNOSIS — D63.1 ANEMIA OF CHRONIC RENAL FAILURE, STAGE 4 (SEVERE) (H): ICD-10-CM

## 2024-11-14 DIAGNOSIS — N18.4 ANEMIA OF CHRONIC RENAL FAILURE, STAGE 4 (SEVERE) (H): Primary | ICD-10-CM

## 2024-11-14 LAB
FERRITIN SERPL-MCNC: 393 NG/ML (ref 11–328)
HCT VFR BLD AUTO: 34.2 % (ref 35–47)
HGB BLD-MCNC: 10.8 G/DL (ref 11.7–15.7)
HOLD SPECIMEN: NORMAL
IRON BINDING CAPACITY (ROCHE): 178 UG/DL (ref 240–430)
IRON SATN MFR SERPL: 46 % (ref 15–46)
IRON SERPL-MCNC: 82 UG/DL (ref 37–145)

## 2024-11-14 PROCEDURE — 96372 THER/PROPH/DIAG INJ SC/IM: CPT | Performed by: INTERNAL MEDICINE

## 2024-11-14 PROCEDURE — 82728 ASSAY OF FERRITIN: CPT

## 2024-11-14 PROCEDURE — 99207 PR NO CHARGE LOS: CPT

## 2024-11-14 PROCEDURE — 36415 COLL VENOUS BLD VENIPUNCTURE: CPT

## 2024-11-14 PROCEDURE — 85014 HEMATOCRIT: CPT

## 2024-11-14 PROCEDURE — 83550 IRON BINDING TEST: CPT

## 2024-11-14 PROCEDURE — 250N000011 HC RX IP 250 OP 636: Mod: JZ | Performed by: INTERNAL MEDICINE

## 2024-11-14 RX ORDER — METHYLPREDNISOLONE SODIUM SUCCINATE 40 MG/ML
40 INJECTION INTRAMUSCULAR; INTRAVENOUS
Start: 2024-11-29

## 2024-11-14 RX ORDER — MEPERIDINE HYDROCHLORIDE 25 MG/ML
25 INJECTION INTRAMUSCULAR; INTRAVENOUS; SUBCUTANEOUS
Status: DISCONTINUED | OUTPATIENT
Start: 2024-11-14 | End: 2024-11-14

## 2024-11-14 RX ORDER — DIPHENHYDRAMINE HYDROCHLORIDE 50 MG/ML
50 INJECTION INTRAMUSCULAR; INTRAVENOUS
Start: 2024-11-29

## 2024-11-14 RX ORDER — EPINEPHRINE 1 MG/ML
0.3 INJECTION, SOLUTION INTRAMUSCULAR; SUBCUTANEOUS EVERY 5 MIN PRN
Status: DISCONTINUED | OUTPATIENT
Start: 2024-11-14 | End: 2024-11-14

## 2024-11-14 RX ORDER — DIPHENHYDRAMINE HYDROCHLORIDE 50 MG/ML
50 INJECTION INTRAMUSCULAR; INTRAVENOUS
Status: DISCONTINUED | OUTPATIENT
Start: 2024-11-14 | End: 2024-11-14

## 2024-11-14 RX ORDER — MEPERIDINE HYDROCHLORIDE 25 MG/ML
25 INJECTION INTRAMUSCULAR; INTRAVENOUS; SUBCUTANEOUS
OUTPATIENT
Start: 2024-11-29

## 2024-11-14 RX ORDER — ALBUTEROL SULFATE 90 UG/1
1-2 INHALANT RESPIRATORY (INHALATION)
Start: 2024-11-29

## 2024-11-14 RX ORDER — ALBUTEROL SULFATE 0.83 MG/ML
2.5 SOLUTION RESPIRATORY (INHALATION)
Status: DISCONTINUED | OUTPATIENT
Start: 2024-11-14 | End: 2024-11-14

## 2024-11-14 RX ORDER — METHYLPREDNISOLONE SODIUM SUCCINATE 40 MG/ML
40 INJECTION INTRAMUSCULAR; INTRAVENOUS
Status: DISCONTINUED | OUTPATIENT
Start: 2024-11-14 | End: 2024-11-14

## 2024-11-14 RX ORDER — DIPHENHYDRAMINE HYDROCHLORIDE 50 MG/ML
25 INJECTION INTRAMUSCULAR; INTRAVENOUS
Status: DISCONTINUED | OUTPATIENT
Start: 2024-11-14 | End: 2024-11-14

## 2024-11-14 RX ORDER — EPINEPHRINE 1 MG/ML
0.3 INJECTION, SOLUTION INTRAMUSCULAR; SUBCUTANEOUS EVERY 5 MIN PRN
OUTPATIENT
Start: 2024-11-29

## 2024-11-14 RX ORDER — DIPHENHYDRAMINE HYDROCHLORIDE 50 MG/ML
25 INJECTION INTRAMUSCULAR; INTRAVENOUS
Start: 2024-11-29

## 2024-11-14 RX ORDER — ALBUTEROL SULFATE 90 UG/1
1-2 INHALANT RESPIRATORY (INHALATION)
Status: DISCONTINUED | OUTPATIENT
Start: 2024-11-14 | End: 2024-11-14

## 2024-11-14 RX ORDER — ALBUTEROL SULFATE 0.83 MG/ML
2.5 SOLUTION RESPIRATORY (INHALATION)
OUTPATIENT
Start: 2024-11-29

## 2024-11-14 RX ADMIN — DARBEPOETIN ALFA 60 MCG: 60 INJECTION, SOLUTION INTRAVENOUS; SUBCUTANEOUS at 11:46

## 2024-11-14 NOTE — PROGRESS NOTES
Infusion Nursing Note:  Evelyn SHANIQUA Cisneros presents today for Aranesp.    Patient seen by provider today: No   present during visit today: Not Applicable.    Note: Assessment performed by Tea MCGOVERN RN prior to injection today.    Intravenous Access:  No Intravenous access/labs at this visit.    Treatment Conditions:  Lab Results   Component Value Date    HGB 10.8 (L) 11/14/2024    WBC 5.1 10/01/2024    ANEU 2.9 01/14/2016     10/01/2024        Results reviewed, labs MET treatment parameters, ok to proceed with treatment.      Post Infusion Assessment:  Patient tolerated injection without incident.  Site patent and intact, free from redness, edema or discomfort.       Discharge Plan:   Patient discharged in stable condition accompanied by: self.  Departure Mode: Ambulatory.      Melina Cunningham LPN

## 2024-11-14 NOTE — PROGRESS NOTES
Anemia Management Note - Follow Up      SUBJECTIVE/OBJECTIVE:    Referred by Dr. Nathan Blood on 2024  Primary Diagnosis: Anemia in Chronic Kidney Disease (N18.4, D63.1)     Secondary Diagnosis:  Chronic Kidney Disease, Stage 4 (N18.4)   Hgb goal range:  10-11 *range increased on 24 per Dr. Blood     Epo/Darbo: Aranesp 60mcg every 14 days for Hgb <11.0. In Clinic.   Iron regimen:  Treat with IV Iron as needed.     *ok to stop oral Iron.  No improvement in Iron levels with Oral Iron.   *completed Injectafer on 24.      Labs : 2025  RX/TX plans : 7/10/2025     *staying in [place].      No history of stroke, MI, and blood clots or cancers.  Recent BRANDON use, transfusion, IV iron: NA     Contact:            No Consent to Communicate on File.         Latest Ref Rng & Units 2024 2024 10/1/2024 10/4/2024 10/17/2024 10/31/2024 2024   Anemia   HGB Goal  10 - 11 10 - 11     10 - 11   BRANDON Dose  60 mcg 60 mcg     60 mcg   Hemoglobin 11.7 - 15.7 g/dL 10.9  10.8  11.4  11.2  11.2  11.3  10.8    TSAT 15 - 46 %  30    36   46    Ferritin 11 - 328 ng/mL  435    388   393         BP Readings from Last 3 Encounters:   24 137/82   10/07/24 (!) 148/73   24 124/75     Wt Readings from Last 2 Encounters:   10/07/24 85.3 kg (188 lb)   24 88.9 kg (196 lb)           ASSESSMENT:    Hgb:at goal - received dose in clinic - recommend continue current regimen  TSat: at goal >30% Ferritin: At goal (>100ng/mL)   Goals Addressed    None         PLAN:  Dose with Aranesp.  RTC for hgb then Aranesp if needed in 2 week(s).    Orders needed to be renewed (for next follow-up date) in EPIC: None    Iron labs due:  Mid Dec 2024    Plan discussed with:  No call, chart review       NEXT FOLLOW-UP DATE:  24    Archana Salamanca RN   Anemia Services  [facility]  [email or web address]   Office : [phone]  Fax: [phone]       normal...

## 2024-11-27 ENCOUNTER — LAB (OUTPATIENT)
Dept: INFUSION THERAPY | Facility: CLINIC | Age: 59
End: 2024-11-27
Attending: INTERNAL MEDICINE
Payer: MEDICARE

## 2024-11-27 DIAGNOSIS — N18.4 ANEMIA OF CHRONIC RENAL FAILURE, STAGE 4 (SEVERE) (H): ICD-10-CM

## 2024-11-27 DIAGNOSIS — D63.1 ANEMIA OF CHRONIC RENAL FAILURE, STAGE 4 (SEVERE) (H): ICD-10-CM

## 2024-11-27 DIAGNOSIS — N18.4 CKD (CHRONIC KIDNEY DISEASE) STAGE 4, GFR 15-29 ML/MIN (H): ICD-10-CM

## 2024-11-27 LAB
FERRITIN SERPL-MCNC: 428 NG/ML (ref 11–328)
HCT VFR BLD AUTO: 37.3 % (ref 35–47)
HGB BLD-MCNC: 11.8 G/DL (ref 11.7–15.7)
HOLD SPECIMEN: NORMAL
IRON BINDING CAPACITY (ROCHE): 196 UG/DL (ref 240–430)
IRON SATN MFR SERPL: 43 % (ref 15–46)
IRON SERPL-MCNC: 85 UG/DL (ref 37–145)

## 2024-11-27 PROCEDURE — 83550 IRON BINDING TEST: CPT

## 2024-11-27 PROCEDURE — 85018 HEMOGLOBIN: CPT

## 2024-11-27 PROCEDURE — 82728 ASSAY OF FERRITIN: CPT

## 2024-11-27 PROCEDURE — 99207 PR NO CHARGE LOS: CPT

## 2024-11-27 PROCEDURE — 83540 ASSAY OF IRON: CPT

## 2024-11-27 NOTE — PROGRESS NOTES
Infusion Nursing Note:  Evelyn SHANIQUA Cisneros presents today for [facility].    Patient seen by provider today: No   present during visit today: Not Applicable.    Note: N/A.      Intravenous Access:  No Intravenous access/labs at this visit.    Treatment Conditions:  Lab Results   Component Value Date    HGB 11.8 11/27/2024    WBC 5.1 10/01/2024    ANEU 2.9 01/14/2016     10/01/2024        Results reviewed, labs did NOT meet treatment parameters: Paramenters 11,patient did not receive injection.      Post Infusion Assessment:  N/A.       Discharge Plan:   N/A.      Samantha Tobar MA

## 2024-12-12 ENCOUNTER — LAB (OUTPATIENT)
Dept: INFUSION THERAPY | Facility: CLINIC | Age: 59
End: 2024-12-12
Attending: INTERNAL MEDICINE
Payer: MEDICARE

## 2024-12-12 ENCOUNTER — PATIENT OUTREACH (OUTPATIENT)
Dept: CARE COORDINATION | Facility: CLINIC | Age: 59
End: 2024-12-12

## 2024-12-12 VITALS — HEART RATE: 74 BPM | SYSTOLIC BLOOD PRESSURE: 133 MMHG | OXYGEN SATURATION: 99 % | DIASTOLIC BLOOD PRESSURE: 87 MMHG

## 2024-12-12 DIAGNOSIS — N18.4 CKD (CHRONIC KIDNEY DISEASE) STAGE 4, GFR 15-29 ML/MIN (H): ICD-10-CM

## 2024-12-12 DIAGNOSIS — D63.1 ANEMIA OF CHRONIC RENAL FAILURE, STAGE 4 (SEVERE) (H): ICD-10-CM

## 2024-12-12 DIAGNOSIS — N18.4 ANEMIA OF CHRONIC RENAL FAILURE, STAGE 4 (SEVERE) (H): ICD-10-CM

## 2024-12-12 DIAGNOSIS — D63.1 ANEMIA OF CHRONIC RENAL FAILURE, STAGE 4 (SEVERE) (H): Primary | ICD-10-CM

## 2024-12-12 DIAGNOSIS — N18.4 ANEMIA OF CHRONIC RENAL FAILURE, STAGE 4 (SEVERE) (H): Primary | ICD-10-CM

## 2024-12-12 LAB
FERRITIN SERPL-MCNC: 369 NG/ML (ref 11–328)
HCT VFR BLD AUTO: 31.9 % (ref 35–47)
HGB BLD-MCNC: 10.7 G/DL (ref 11.7–15.7)
HOLD SPECIMEN: NORMAL
IRON BINDING CAPACITY (ROCHE): 181 UG/DL (ref 240–430)
IRON SATN MFR SERPL: 31 % (ref 15–46)
IRON SERPL-MCNC: 57 UG/DL (ref 37–145)

## 2024-12-12 PROCEDURE — 96372 THER/PROPH/DIAG INJ SC/IM: CPT | Performed by: INTERNAL MEDICINE

## 2024-12-12 PROCEDURE — 83550 IRON BINDING TEST: CPT

## 2024-12-12 PROCEDURE — 250N000011 HC RX IP 250 OP 636: Mod: JZ | Performed by: INTERNAL MEDICINE

## 2024-12-12 PROCEDURE — 83540 ASSAY OF IRON: CPT

## 2024-12-12 PROCEDURE — 85018 HEMOGLOBIN: CPT

## 2024-12-12 PROCEDURE — 82728 ASSAY OF FERRITIN: CPT

## 2024-12-12 RX ORDER — ALBUTEROL SULFATE 0.83 MG/ML
2.5 SOLUTION RESPIRATORY (INHALATION)
OUTPATIENT
Start: 2024-12-13

## 2024-12-12 RX ORDER — DIPHENHYDRAMINE HYDROCHLORIDE 50 MG/ML
25 INJECTION INTRAMUSCULAR; INTRAVENOUS
Start: 2024-12-13

## 2024-12-12 RX ORDER — METHYLPREDNISOLONE SODIUM SUCCINATE 40 MG/ML
40 INJECTION INTRAMUSCULAR; INTRAVENOUS
Start: 2024-12-13

## 2024-12-12 RX ORDER — MEPERIDINE HYDROCHLORIDE 25 MG/ML
25 INJECTION INTRAMUSCULAR; INTRAVENOUS; SUBCUTANEOUS
OUTPATIENT
Start: 2024-12-13

## 2024-12-12 RX ORDER — EPINEPHRINE 1 MG/ML
0.3 INJECTION, SOLUTION INTRAMUSCULAR; SUBCUTANEOUS EVERY 5 MIN PRN
OUTPATIENT
Start: 2024-12-13

## 2024-12-12 RX ORDER — DIPHENHYDRAMINE HYDROCHLORIDE 50 MG/ML
50 INJECTION INTRAMUSCULAR; INTRAVENOUS
Start: 2024-12-13

## 2024-12-12 RX ORDER — ALBUTEROL SULFATE 90 UG/1
1-2 INHALANT RESPIRATORY (INHALATION)
Start: 2024-12-13

## 2024-12-12 RX ADMIN — DARBEPOETIN ALFA 60 MCG: 60 INJECTION, SOLUTION INTRAVENOUS; SUBCUTANEOUS at 12:36

## 2024-12-12 ASSESSMENT — PAIN SCALES - GENERAL: PAINLEVEL_OUTOF10: NO PAIN (0)

## 2024-12-12 NOTE — PROGRESS NOTES
Anemia Management Note - Follow Up      SUBJECTIVE/OBJECTIVE:    Referred by Dr. Nathan Blood on 2024  Primary Diagnosis: Anemia in Chronic Kidney Disease (N18.4, D63.1)     Secondary Diagnosis:  Chronic Kidney Disease, Stage 4 (N18.4)   Hgb goal range:  10-11 *range increased on 24 per Dr. Blood     Epo/Darbo: Aranesp 60mcg every 14 days for Hgb <11.0. In Clinic.   Iron regimen:  Treat with IV Iron as needed.     *ok to stop oral Iron.  No improvement in Iron levels with Oral Iron.   *completed Injectafer on 24.      Labs : 2025  RX/TX plans : 7/10/2025     *staying in [place].      No history of stroke, MI, and blood clots or cancers.  Recent BRANDON use, transfusion, IV iron: NA     Contact:            No Consent to Communicate on File.          Latest Ref Rng & Units 10/1/2024 10/4/2024 10/17/2024 10/31/2024 2024 2024 2024   Anemia   HGB Goal      10 - 11  10 - 11   BRANDON Dose      60 mcg  60 mcg   Hemoglobin 11.7 - 15.7 g/dL 11.4  11.2  11.2  11.3  10.8  11.8  10.7    TSAT 15 - 46 %   36   46  43  31    Ferritin 11 - 328 ng/mL   388   393  428  369         BP Readings from Last 3 Encounters:   24 133/87   24 137/82   10/07/24 (!) 148/73     Wt Readings from Last 2 Encounters:   10/07/24 85.3 kg (188 lb)   24 88.9 kg (196 lb)           ASSESSMENT:    Hgb:at goal - received dose in clinic - recommend continue current regimen  TSat: at goal >30% Ferritin: At goal (>100ng/mL)        PLAN:  Dose with Aranesp.  RTC for hgb then Aranesp if needed in 2 week(s).    Orders needed to be renewed (for next follow-up date) in EPIC: None    Iron labs due:  MId 2025    Plan discussed with:  No call, chart review       NEXT FOLLOW-UP DATE:  24    Archana Salamanca RN   Anemia Services  [facility]  [email or web address]   Office : [phone]  Fax: [phone]

## 2024-12-12 NOTE — PROGRESS NOTES
Infusion Nursing Note:  [facility]    Patient seen by provider today: No   present during visit today: Not Applicable.    Note: Assessment performed by Elizabeth BOATENG RN prior to injection today. .      Intravenous Access:  No Intravenous access/labs at this visit.    Treatment Conditions:  Lab Results   Component Value Date    HGB 10.7 (L) 12/12/2024    WBC 5.1 10/01/2024    ANEU 2.9 01/14/2016     10/01/2024        Results reviewed, labs MET treatment parameters, ok to proceed with treatment.      Post Infusion Assessment:  Patient tolerated injection without incident.  Site patent and intact, free from redness, edema or discomfort.       Discharge Plan:   Patient discharged in stable condition accompanied by: self.  Departure Mode: Ambulatory.      Samantha Tobar MA

## 2024-12-26 ENCOUNTER — LAB (OUTPATIENT)
Dept: INFUSION THERAPY | Facility: CLINIC | Age: 59
End: 2024-12-26
Attending: INTERNAL MEDICINE
Payer: MEDICARE

## 2024-12-26 ENCOUNTER — PATIENT OUTREACH (OUTPATIENT)
Dept: CARE COORDINATION | Facility: CLINIC | Age: 59
End: 2024-12-26

## 2024-12-26 DIAGNOSIS — N18.4 CKD (CHRONIC KIDNEY DISEASE) STAGE 4, GFR 15-29 ML/MIN (H): ICD-10-CM

## 2024-12-26 DIAGNOSIS — D63.1 ANEMIA OF CHRONIC RENAL FAILURE, STAGE 4 (SEVERE) (H): Primary | ICD-10-CM

## 2024-12-26 DIAGNOSIS — N18.4 ANEMIA OF CHRONIC RENAL FAILURE, STAGE 4 (SEVERE) (H): Primary | ICD-10-CM

## 2024-12-26 DIAGNOSIS — N18.4 ANEMIA OF CHRONIC RENAL FAILURE, STAGE 4 (SEVERE) (H): ICD-10-CM

## 2024-12-26 DIAGNOSIS — D63.1 ANEMIA OF CHRONIC RENAL FAILURE, STAGE 4 (SEVERE) (H): ICD-10-CM

## 2024-12-26 LAB
HCT VFR BLD AUTO: 36 % (ref 35–47)
HGB BLD-MCNC: 11.6 G/DL (ref 11.7–15.7)
HOLD SPECIMEN: NORMAL
HOLD SPECIMEN: NORMAL

## 2024-12-26 PROCEDURE — 85018 HEMOGLOBIN: CPT

## 2024-12-26 NOTE — PROGRESS NOTES
Infusion Nursing Note:  Evelyn MCGOVERN Amelia presents today for Aranesp- NOT NEEDED.    Patient seen by provider today: No   present during visit today: Not Applicable.    Note: N/A.      Intravenous Access:  No Intravenous access/labs at this visit.    Treatment Conditions:  Lab Results   Component Value Date    HGB 11.6 (L) 12/26/2024    WBC 5.1 10/01/2024    ANEU 2.9 01/14/2016     10/01/2024        Results reviewed, labs did NOT meet treatment parameters: Hgb > 11.0      Post Infusion Assessment:  Not applicable.       Discharge Plan:   Patient discharged in stable condition accompanied by: self.  Departure Mode: Ambulatory.      Melina Cunningham LPN

## 2024-12-26 NOTE — PROGRESS NOTES
Anemia Management Note - Follow Up      SUBJECTIVE/OBJECTIVE:      Referred by Dr. Nathan Blood on 2024  Primary Diagnosis: Anemia in Chronic Kidney Disease (N18.4, D63.1)     Secondary Diagnosis:  Chronic Kidney Disease, Stage 4 (N18.4)   Hgb goal range:  10-11 *range increased on 24 per Dr. Blood     Epo/Darbo: Aranesp 60mcg every 14 days for Hgb <11.0. In Clinic.   Iron regimen:  Treat with IV Iron as needed.     *ok to stop oral Iron.  No improvement in Iron levels with Oral Iron.   *completed Injectafer on 24.      Labs : 2025  RX/TX plans : 7/10/2025     *staying in [place].      No history of stroke, MI, and blood clots or cancers.  Recent BRANDON use, transfusion, IV iron: NA     Contact:            No Consent to Communicate on File.        Latest Ref Rng & Units 10/4/2024 10/17/2024 10/31/2024 2024 2024 2024 2024   Anemia   HGB Goal     10 - 11  10 - 11    BRANDON Dose     60 mcg  60 mcg    Hemoglobin 11.7 - 15.7 g/dL 11.2  11.2  11.3  10.8  11.8  10.7  11.6    TSAT 15 - 46 %  36   46  43  31     Ferritin 11 - 328 ng/mL  388   393  428  369       BP Readings from Last 3 Encounters:   24 133/87   24 137/82   10/07/24 (!) 148/73     Wt Readings from Last 2 Encounters:   10/07/24 85.3 kg (188 lb)   24 88.9 kg (196 lb)           ASSESSMENT:    Hgb:Above goal - recommend hold dose  TSat: at goal >30% Ferritin: At goal (>100ng/mL)        PLAN:  Hold Aranesp.  RTC for hgb then Aranesp if needed in 2 week(s).  Aranesp is scheduled for  & .     Orders needed to be renewed (for next follow-up date) in EPIC: None    Iron labs due:  2025    Plan discussed with:  No call, chart review       NEXT FOLLOW-UP DATE:  25    Archana Salamanca RN   Anemia Services  [facility]  [email or web address]   Office : [phone]  Fax: [phone]

## 2025-01-07 ENCOUNTER — LAB (OUTPATIENT)
Dept: LAB | Facility: CLINIC | Age: 60
End: 2025-01-07
Payer: MEDICARE

## 2025-01-07 DIAGNOSIS — N18.4 ANEMIA OF CHRONIC RENAL FAILURE, STAGE 4 (SEVERE) (H): ICD-10-CM

## 2025-01-07 DIAGNOSIS — D63.1 ANEMIA OF CHRONIC RENAL FAILURE, STAGE 4 (SEVERE) (H): ICD-10-CM

## 2025-01-07 DIAGNOSIS — N18.4 CKD (CHRONIC KIDNEY DISEASE) STAGE 4, GFR 15-29 ML/MIN (H): ICD-10-CM

## 2025-01-07 LAB
FERRITIN SERPL-MCNC: 389 NG/ML (ref 11–328)
HCT VFR BLD AUTO: 39.3 % (ref 35–47)
HGB BLD-MCNC: 12.6 G/DL (ref 11.7–15.7)
IRON BINDING CAPACITY (ROCHE): 188 UG/DL (ref 240–430)
IRON SATN MFR SERPL: 35 % (ref 15–46)
IRON SERPL-MCNC: 66 UG/DL (ref 37–145)

## 2025-01-07 PROCEDURE — 85018 HEMOGLOBIN: CPT

## 2025-01-07 PROCEDURE — 83540 ASSAY OF IRON: CPT

## 2025-01-07 PROCEDURE — 82728 ASSAY OF FERRITIN: CPT

## 2025-01-07 PROCEDURE — 36415 COLL VENOUS BLD VENIPUNCTURE: CPT

## 2025-01-07 PROCEDURE — 83550 IRON BINDING TEST: CPT

## 2025-01-07 PROCEDURE — 85014 HEMATOCRIT: CPT

## 2025-01-09 ENCOUNTER — INFUSION THERAPY VISIT (OUTPATIENT)
Dept: INFUSION THERAPY | Facility: CLINIC | Age: 60
End: 2025-01-09
Attending: INTERNAL MEDICINE
Payer: MEDICARE

## 2025-01-09 DIAGNOSIS — N18.4 ANEMIA OF CHRONIC RENAL FAILURE, STAGE 4 (SEVERE) (H): Primary | ICD-10-CM

## 2025-01-09 DIAGNOSIS — D63.1 ANEMIA OF CHRONIC RENAL FAILURE, STAGE 4 (SEVERE) (H): Primary | ICD-10-CM

## 2025-01-09 LAB
HCT VFR BLD AUTO: 35.4 % (ref 35–47)
HGB BLD-MCNC: 11.2 G/DL (ref 11.7–15.7)
HOLD SPECIMEN: NORMAL

## 2025-01-09 PROCEDURE — 85018 HEMOGLOBIN: CPT | Performed by: INTERNAL MEDICINE

## 2025-01-09 PROCEDURE — 85014 HEMATOCRIT: CPT | Performed by: INTERNAL MEDICINE

## 2025-01-09 RX ORDER — MEPERIDINE HYDROCHLORIDE 25 MG/ML
25 INJECTION INTRAMUSCULAR; INTRAVENOUS; SUBCUTANEOUS
OUTPATIENT
Start: 2025-01-10

## 2025-01-09 RX ORDER — EPINEPHRINE 1 MG/ML
0.3 INJECTION, SOLUTION INTRAMUSCULAR; SUBCUTANEOUS EVERY 5 MIN PRN
OUTPATIENT
Start: 2025-01-10

## 2025-01-09 RX ORDER — DIPHENHYDRAMINE HYDROCHLORIDE 50 MG/ML
25 INJECTION INTRAMUSCULAR; INTRAVENOUS
Start: 2025-01-10

## 2025-01-09 RX ORDER — ALBUTEROL SULFATE 90 UG/1
1-2 INHALANT RESPIRATORY (INHALATION)
Start: 2025-01-10

## 2025-01-09 RX ORDER — METHYLPREDNISOLONE SODIUM SUCCINATE 40 MG/ML
40 INJECTION INTRAMUSCULAR; INTRAVENOUS
Start: 2025-01-10

## 2025-01-09 RX ORDER — ALBUTEROL SULFATE 0.83 MG/ML
2.5 SOLUTION RESPIRATORY (INHALATION)
OUTPATIENT
Start: 2025-01-10

## 2025-01-09 RX ORDER — DIPHENHYDRAMINE HYDROCHLORIDE 50 MG/ML
50 INJECTION INTRAMUSCULAR; INTRAVENOUS
Start: 2025-01-10

## 2025-01-09 NOTE — PROGRESS NOTES
Infusion Nursing Note:  Evelyn MCGOVERN Amelia presents today for Aranesp- NOT NEEDED.    Patient seen by provider today: No   present during visit today: Not Applicable.    Note: not applicable      Intravenous Access:  No Intravenous access/labs at this visit.    Treatment Conditions:  Lab Results   Component Value Date    HGB 11.2 (L) 01/09/2025    WBC 5.1 10/01/2024    ANEU 2.9 01/14/2016     10/01/2024        Results reviewed, labs did NOT meet treatment parameters: Hgb >11.0      Post Infusion Assessment:  Not applicable.       Discharge Plan:   Patient discharged in stable condition accompanied by: daughter.  Departure Mode: Ambulatory.      Melina Cunningham LPN

## 2025-01-21 ENCOUNTER — MYC MEDICAL ADVICE (OUTPATIENT)
Dept: NEPHROLOGY | Facility: CLINIC | Age: 60
End: 2025-01-21
Payer: MEDICARE

## 2025-01-21 DIAGNOSIS — I12.9 CKD STAGE 4 SECONDARY TO HYPERTENSION (H): Primary | ICD-10-CM

## 2025-01-21 DIAGNOSIS — N18.4 CKD STAGE 4 SECONDARY TO HYPERTENSION (H): Primary | ICD-10-CM

## 2025-01-23 ENCOUNTER — PATIENT OUTREACH (OUTPATIENT)
Dept: CARE COORDINATION | Facility: CLINIC | Age: 60
End: 2025-01-23
Payer: MEDICARE

## 2025-01-23 ENCOUNTER — INFUSION THERAPY VISIT (OUTPATIENT)
Dept: INFUSION THERAPY | Facility: CLINIC | Age: 60
End: 2025-01-23
Attending: INTERNAL MEDICINE
Payer: MEDICARE

## 2025-01-23 DIAGNOSIS — N18.4 ANEMIA OF CHRONIC RENAL FAILURE, STAGE 4 (SEVERE) (H): Primary | ICD-10-CM

## 2025-01-23 DIAGNOSIS — D63.1 ANEMIA OF CHRONIC RENAL FAILURE, STAGE 4 (SEVERE) (H): Primary | ICD-10-CM

## 2025-01-23 LAB
HCT VFR BLD AUTO: 34.4 % (ref 35–47)
HGB BLD-MCNC: 11 G/DL (ref 11.7–15.7)
HOLD SPECIMEN: NORMAL
HOLD SPECIMEN: NORMAL

## 2025-01-23 PROCEDURE — 85014 HEMATOCRIT: CPT | Performed by: INTERNAL MEDICINE

## 2025-01-23 PROCEDURE — 85018 HEMOGLOBIN: CPT | Performed by: INTERNAL MEDICINE

## 2025-01-23 RX ORDER — DIPHENHYDRAMINE HYDROCHLORIDE 50 MG/ML
50 INJECTION INTRAMUSCULAR; INTRAVENOUS
Start: 2025-01-24

## 2025-01-23 RX ORDER — EPINEPHRINE 1 MG/ML
0.3 INJECTION, SOLUTION INTRAMUSCULAR; SUBCUTANEOUS EVERY 5 MIN PRN
OUTPATIENT
Start: 2025-01-24

## 2025-01-23 RX ORDER — MEPERIDINE HYDROCHLORIDE 25 MG/ML
25 INJECTION INTRAMUSCULAR; INTRAVENOUS; SUBCUTANEOUS
OUTPATIENT
Start: 2025-01-24

## 2025-01-23 RX ORDER — ALBUTEROL SULFATE 90 UG/1
1-2 INHALANT RESPIRATORY (INHALATION)
Start: 2025-01-24

## 2025-01-23 RX ORDER — METHYLPREDNISOLONE SODIUM SUCCINATE 40 MG/ML
40 INJECTION INTRAMUSCULAR; INTRAVENOUS
Start: 2025-01-24

## 2025-01-23 RX ORDER — ALBUTEROL SULFATE 0.83 MG/ML
2.5 SOLUTION RESPIRATORY (INHALATION)
OUTPATIENT
Start: 2025-01-24

## 2025-01-23 RX ORDER — DIPHENHYDRAMINE HYDROCHLORIDE 50 MG/ML
25 INJECTION INTRAMUSCULAR; INTRAVENOUS
Start: 2025-01-24

## 2025-01-23 NOTE — PROGRESS NOTES
Infusion Nursing Note:  Evelyn MCGOVERN Amelia presents today for Aranesp- NOT NEEDED.    Patient seen by provider today: No   present during visit today: Not Applicable.    Note: N/A.      Intravenous Access:  No Intravenous access/labs at this visit.    Treatment Conditions:  Lab Results   Component Value Date    HGB 11.0 (L) 01/23/2025    WBC 5.1 10/01/2024    ANEU 2.9 01/14/2016     10/01/2024        Results reviewed, labs did NOT meet treatment parameters: Hgb = 11.0      Post Infusion Assessment:  Not applicable.       Discharge Plan:   Patient discharged in stable condition accompanied by: self.  Departure Mode: Ambulatory.  Patient has next lab/aranesp appt scheduled for 2/6/25 April PADMINI Cunningham

## 2025-01-23 NOTE — PROGRESS NOTES
Anemia Management Note - Follow Up      SUBJECTIVE/OBJECTIVE:    Referred by Dr. Nathan Blood on 2024  Primary Diagnosis: Anemia in Chronic Kidney Disease (N18.4, D63.1)     Secondary Diagnosis:  Chronic Kidney Disease, Stage 4 (N18.4)   Hgb goal range:  10-11 *range increased on 24 per Dr. Blood     Epo/Darbo: Aranesp 60mcg every 14 days for Hgb <11.0. In Clinic.   Iron regimen:  Treat with IV Iron as needed.     *ok to stop oral Iron.  No improvement in Iron levels with Oral Iron.   *completed Injectafer on 24.      Labs : 2025  RX/TX plans : 7/10/2025     *staying in [place].      No history of stroke, MI, and blood clots or cancers.  Recent BRANDON use, transfusion, IV iron: NA     Contact:            No Consent to Communicate on File.        Latest Ref Rng & Units 10/31/2024 2024 2024 2024 2024 2025 2025   Anemia   HGB Goal   10 - 11  10 - 11      BRANDON Dose   60 mcg  60 mcg      Hemoglobin 11.7 - 15.7 g/dL 11.3  10.8  11.8  10.7  11.6  12.6  11.2    TSAT 15 - 46 %  46  43  31   35     Ferritin 11 - 328 ng/mL  393  428  369   389       BP Readings from Last 3 Encounters:   24 133/87   24 137/82   10/07/24 (!) 148/73     Wt Readings from Last 2 Encounters:   10/07/24 85.3 kg (188 lb)   24 88.9 kg (196 lb)           ASSESSMENT:    Hgb:Above goal - recommend hold dose  TSat: at goal >30% Ferritin: At goal (>100ng/mL)   Goals Addressed    None         PLAN:  Hold Aranesp.  RTC for hgb then Aranesp if needed in 2-4 week(s). Aranesp is scheduled for  & .   If Hgb >10.0 on , ok to schedule Monthly Aranesp.     Orders needed to be renewed (for next follow-up date) in EPIC: None    Iron labs due:  Early 2025    Plan discussed with:  No call, chart review       NEXT FOLLOW-UP DATE:  25    Archana Salamanca RN   Anemia Services  [facility]  [email or web address]   Office : [phone]  Fax:  [phone]

## 2025-02-05 ENCOUNTER — TELEPHONE (OUTPATIENT)
Dept: NEPHROLOGY | Facility: CLINIC | Age: 60
End: 2025-02-05
Payer: MEDICARE

## 2025-02-05 NOTE — TELEPHONE ENCOUNTER
2/5/2025 11:08AM  Patient Contacted for the patient to call back and reschedule the following:    Appointment type: Return Nephrology  Provider: Dr. Blood  Return date: Next available (offer [facility]SC, MG, and waitlist)  Specialty phone number: [phone]  Additional appointment(s) needed: labs prior  Additonal Notes: 2/5 Pt requesting a call at 3:30PM to reschedule Return Nephrology w/ Dr. Blood w/ labs prior. Offer [facility] and MG and waitlist. [facility]   Rheumatology, Gastroenterology, Nephrology, Infectious Disease, Pulmonology  [facility]        
Yes

## 2025-02-20 ENCOUNTER — PATIENT OUTREACH (OUTPATIENT)
Dept: CARE COORDINATION | Facility: CLINIC | Age: 60
End: 2025-02-20

## 2025-02-20 ENCOUNTER — INFUSION THERAPY VISIT (OUTPATIENT)
Dept: INFUSION THERAPY | Facility: CLINIC | Age: 60
End: 2025-02-20
Attending: INTERNAL MEDICINE
Payer: MEDICARE

## 2025-02-20 VITALS
HEART RATE: 72 BPM | OXYGEN SATURATION: 97 % | DIASTOLIC BLOOD PRESSURE: 70 MMHG | RESPIRATION RATE: 18 BRPM | SYSTOLIC BLOOD PRESSURE: 112 MMHG | WEIGHT: 187 LBS | TEMPERATURE: 98.4 F

## 2025-02-20 DIAGNOSIS — D63.1 ANEMIA OF CHRONIC RENAL FAILURE, STAGE 4 (SEVERE) (H): Primary | ICD-10-CM

## 2025-02-20 DIAGNOSIS — N18.4 ANEMIA OF CHRONIC RENAL FAILURE, STAGE 4 (SEVERE) (H): Primary | ICD-10-CM

## 2025-02-20 LAB
HCT VFR BLD AUTO: 33.5 % (ref 35–47)
HGB BLD-MCNC: 10.7 G/DL (ref 11.7–15.7)
HOLD SPECIMEN: NORMAL
HOLD SPECIMEN: NORMAL

## 2025-02-20 PROCEDURE — 85014 HEMATOCRIT: CPT | Performed by: INTERNAL MEDICINE

## 2025-02-20 PROCEDURE — 96372 THER/PROPH/DIAG INJ SC/IM: CPT | Performed by: INTERNAL MEDICINE

## 2025-02-20 PROCEDURE — 85018 HEMOGLOBIN: CPT | Performed by: INTERNAL MEDICINE

## 2025-02-20 PROCEDURE — 250N000011 HC RX IP 250 OP 636: Mod: JZ | Performed by: INTERNAL MEDICINE

## 2025-02-20 RX ORDER — EPINEPHRINE 1 MG/ML
0.3 INJECTION, SOLUTION INTRAMUSCULAR; SUBCUTANEOUS EVERY 5 MIN PRN
OUTPATIENT
Start: 2025-02-21

## 2025-02-20 RX ORDER — ALBUTEROL SULFATE 0.83 MG/ML
2.5 SOLUTION RESPIRATORY (INHALATION)
Status: CANCELLED | OUTPATIENT
Start: 2025-02-21

## 2025-02-20 RX ORDER — EPINEPHRINE 1 MG/ML
0.3 INJECTION, SOLUTION INTRAMUSCULAR; SUBCUTANEOUS EVERY 5 MIN PRN
Status: CANCELLED | OUTPATIENT
Start: 2025-02-21

## 2025-02-20 RX ORDER — ALBUTEROL SULFATE 0.83 MG/ML
2.5 SOLUTION RESPIRATORY (INHALATION)
OUTPATIENT
Start: 2025-02-21

## 2025-02-20 RX ORDER — DIPHENHYDRAMINE HYDROCHLORIDE 50 MG/ML
50 INJECTION INTRAMUSCULAR; INTRAVENOUS
Status: CANCELLED
Start: 2025-02-21

## 2025-02-20 RX ORDER — DIPHENHYDRAMINE HYDROCHLORIDE 50 MG/ML
25 INJECTION INTRAMUSCULAR; INTRAVENOUS
Start: 2025-02-21

## 2025-02-20 RX ORDER — ALBUTEROL SULFATE 90 UG/1
1-2 INHALANT RESPIRATORY (INHALATION)
Start: 2025-02-21

## 2025-02-20 RX ORDER — MEPERIDINE HYDROCHLORIDE 25 MG/ML
25 INJECTION INTRAMUSCULAR; INTRAVENOUS; SUBCUTANEOUS
OUTPATIENT
Start: 2025-02-21

## 2025-02-20 RX ORDER — MEPERIDINE HYDROCHLORIDE 25 MG/ML
25 INJECTION INTRAMUSCULAR; INTRAVENOUS; SUBCUTANEOUS
Status: CANCELLED | OUTPATIENT
Start: 2025-02-21

## 2025-02-20 RX ORDER — DIPHENHYDRAMINE HYDROCHLORIDE 50 MG/ML
25 INJECTION INTRAMUSCULAR; INTRAVENOUS
Status: CANCELLED
Start: 2025-02-21

## 2025-02-20 RX ORDER — ALBUTEROL SULFATE 90 UG/1
1-2 INHALANT RESPIRATORY (INHALATION)
Status: CANCELLED
Start: 2025-02-21

## 2025-02-20 RX ORDER — DIPHENHYDRAMINE HYDROCHLORIDE 50 MG/ML
50 INJECTION INTRAMUSCULAR; INTRAVENOUS
Start: 2025-02-21

## 2025-02-20 RX ORDER — METHYLPREDNISOLONE SODIUM SUCCINATE 40 MG/ML
40 INJECTION INTRAMUSCULAR; INTRAVENOUS
Status: CANCELLED
Start: 2025-02-21

## 2025-02-20 RX ORDER — METHYLPREDNISOLONE SODIUM SUCCINATE 40 MG/ML
40 INJECTION INTRAMUSCULAR; INTRAVENOUS
Start: 2025-02-21

## 2025-02-20 RX ADMIN — DARBEPOETIN ALFA 60 MCG: 60 INJECTION, SOLUTION INTRAVENOUS; SUBCUTANEOUS at 11:00

## 2025-02-20 ASSESSMENT — PAIN SCALES - GENERAL: PAINLEVEL_OUTOF10: MODERATE PAIN (6)

## 2025-02-20 NOTE — PROGRESS NOTES
Infusion Nursing Note:  Evelyn Cisneros presents today for Aranesp.    Patient seen by provider today: No   present during visit today: Not Applicable.    Note: N/A.      Intravenous Access:  No Intravenous access at this visit.    Treatment Conditions:  Lab Results   Component Value Date    HGB 10.7 (L) 02/20/2025    WBC 5.1 10/01/2024    ANEU 2.9 01/14/2016     10/01/2024        Results reviewed, labs MET treatment parameters, ok to proceed with treatment.      Post Infusion Assessment:  Patient tolerated injection without incident.       Discharge Plan:   Patient discharged in stable condition accompanied by: attendant.  Departure Mode: Ambulatory.      Elizabeth Noel RN

## 2025-02-20 NOTE — PROGRESS NOTES
Anemia Management Note - Follow Up      SUBJECTIVE/OBJECTIVE:    Referred by Dr. Nathan Blood on 2024  Primary Diagnosis: Anemia in Chronic Kidney Disease (N18.4, D63.1)     Secondary Diagnosis:  Chronic Kidney Disease, Stage 4 (N18.4)   Hgb goal range:  10-11 *range increased on 24 per Dr. Blood     Epo/Darbo: Aranesp 60mcg every 14 days for Hgb <11.0. In Clinic.   Iron regimen:  Treat with IV Iron as needed.     *ok to stop oral Iron.  No improvement in Iron levels with Oral Iron.   *completed Injectafer on 24.      Labs : 2025  RX/TX plans : 7/10/2025     *staying in [place].      No history of stroke, MI, and blood clots or cancers.  Recent BRANDON use, transfusion, IV iron: NA     Contact:            No Consent to Communicate on File.        Latest Ref Rng & Units 2024   Anemia   HGB Goal   10 - 11     10 - 11   BRANDON Dose   60 mcg     60 mcg   Hemoglobin 11.7 - 15.7 g/dL 11.8  10.7  11.6  12.6  11.2  11.0  10.7    TSAT 15 - 46 % 43  31   35       Ferritin 11 - 328 ng/mL 428  369   389         BP Readings from Last 3 Encounters:   25 112/70   24 133/87   24 137/82     Wt Readings from Last 2 Encounters:   25 84.8 kg (187 lb)   10/07/24 85.3 kg (188 lb)           ASSESSMENT:    Hgb:at goal - received dose in clinic - recommend continue current regimen  TSat: at goal >30% Ferritin: At goal (>100ng/mL)        PLAN:  Dose with Aranesp.  RTC for hgb then Aranesp if needed in 2 week(s).  Aranesp is scheduled for 3/6 & 3/20.     Orders needed to be renewed (for next follow-up date) in EPIC: None    Iron labs due:  Early 2025    Plan discussed with:  No call, chart review       NEXT FOLLOW-UP DATE:  3/20/25    Archana Salamanca RN   Anemia Services  [facility]  [email or web address]   Office : [phone]  Fax: [phone]

## 2025-03-01 ENCOUNTER — HEALTH MAINTENANCE LETTER (OUTPATIENT)
Age: 60
End: 2025-03-01

## 2025-03-06 ENCOUNTER — LAB (OUTPATIENT)
Dept: INFUSION THERAPY | Facility: CLINIC | Age: 60
End: 2025-03-06
Attending: INTERNAL MEDICINE
Payer: MEDICARE

## 2025-03-06 DIAGNOSIS — D63.1 ANEMIA OF CHRONIC RENAL FAILURE, STAGE 4 (SEVERE) (H): Primary | ICD-10-CM

## 2025-03-06 DIAGNOSIS — N18.4 CKD (CHRONIC KIDNEY DISEASE) STAGE 4, GFR 15-29 ML/MIN (H): ICD-10-CM

## 2025-03-06 DIAGNOSIS — N18.4 CKD STAGE 4 SECONDARY TO HYPERTENSION (H): ICD-10-CM

## 2025-03-06 DIAGNOSIS — N18.4 ANEMIA OF CHRONIC RENAL FAILURE, STAGE 4 (SEVERE) (H): Primary | ICD-10-CM

## 2025-03-06 DIAGNOSIS — D63.1 ANEMIA OF CHRONIC RENAL FAILURE, STAGE 4 (SEVERE) (H): ICD-10-CM

## 2025-03-06 DIAGNOSIS — N18.4 ANEMIA OF CHRONIC RENAL FAILURE, STAGE 4 (SEVERE) (H): ICD-10-CM

## 2025-03-06 DIAGNOSIS — I12.9 CKD STAGE 4 SECONDARY TO HYPERTENSION (H): ICD-10-CM

## 2025-03-06 LAB
ALBUMIN MFR UR ELPH: 25.1 MG/DL
ALBUMIN SERPL BCG-MCNC: 4.1 G/DL (ref 3.5–5.2)
ALBUMIN UR-MCNC: 20 MG/DL
ANION GAP SERPL CALCULATED.3IONS-SCNC: 12 MMOL/L (ref 7–15)
APPEARANCE UR: CLEAR
BACTERIA #/AREA URNS HPF: ABNORMAL /HPF
BILIRUB UR QL STRIP: NEGATIVE
BUN SERPL-MCNC: 33.1 MG/DL (ref 8–23)
CALCIUM SERPL-MCNC: 9.3 MG/DL (ref 8.8–10.4)
CHLORIDE SERPL-SCNC: 105 MMOL/L (ref 98–107)
COLOR UR AUTO: ABNORMAL
CREAT SERPL-MCNC: 2.41 MG/DL (ref 0.51–0.95)
CREAT UR-MCNC: 73.9 MG/DL
EGFRCR SERPLBLD CKD-EPI 2021: 22 ML/MIN/1.73M2
ERYTHROCYTE [DISTWIDTH] IN BLOOD BY AUTOMATED COUNT: 13.2 % (ref 10–15)
GLUCOSE SERPL-MCNC: 97 MG/DL (ref 70–99)
GLUCOSE UR STRIP-MCNC: 500 MG/DL
HCO3 SERPL-SCNC: 27 MMOL/L (ref 22–29)
HCT VFR BLD AUTO: 34.5 % (ref 35–47)
HGB BLD-MCNC: 11.1 G/DL (ref 11.7–15.7)
HGB UR QL STRIP: ABNORMAL
HOLD SPECIMEN: NORMAL
KETONES UR STRIP-MCNC: NEGATIVE MG/DL
LEUKOCYTE ESTERASE UR QL STRIP: ABNORMAL
MCH RBC QN AUTO: 30.4 PG (ref 26.5–33)
MCHC RBC AUTO-ENTMCNC: 32.2 G/DL (ref 31.5–36.5)
MCV RBC AUTO: 95 FL (ref 78–100)
NITRATE UR QL: NEGATIVE
PH UR STRIP: 5.5 [PH] (ref 5–7)
PHOSPHATE SERPL-MCNC: 3.9 MG/DL (ref 2.5–4.5)
PLATELET # BLD AUTO: 177 10E3/UL (ref 150–450)
POTASSIUM SERPL-SCNC: 3.8 MMOL/L (ref 3.4–5.3)
PROT/CREAT 24H UR: 0.34 MG/MG CR (ref 0–0.2)
RBC # BLD AUTO: 3.65 10E6/UL (ref 3.8–5.2)
RBC #/AREA URNS AUTO: ABNORMAL /HPF
SKIP: ABNORMAL
SODIUM SERPL-SCNC: 144 MMOL/L (ref 135–145)
SP GR UR STRIP: 1.01 (ref 1–1.03)
SQUAMOUS #/AREA URNS AUTO: ABNORMAL /LPF
UROBILINOGEN UR STRIP-MCNC: NORMAL MG/DL
WBC # BLD AUTO: 7.3 10E3/UL (ref 4–11)
WBC #/AREA URNS AUTO: ABNORMAL /HPF
WBC CLUMPS #/AREA URNS HPF: PRESENT /HPF
YEAST #/AREA URNS HPF: ABNORMAL /HPF

## 2025-03-06 PROCEDURE — 85048 AUTOMATED LEUKOCYTE COUNT: CPT

## 2025-03-06 PROCEDURE — 82040 ASSAY OF SERUM ALBUMIN: CPT

## 2025-03-06 PROCEDURE — 84156 ASSAY OF PROTEIN URINE: CPT

## 2025-03-06 PROCEDURE — 82310 ASSAY OF CALCIUM: CPT

## 2025-03-06 PROCEDURE — 81003 URINALYSIS AUTO W/O SCOPE: CPT

## 2025-03-06 PROCEDURE — 81001 URINALYSIS AUTO W/SCOPE: CPT

## 2025-03-06 PROCEDURE — 85014 HEMATOCRIT: CPT

## 2025-03-06 NOTE — PROGRESS NOTES
Infusion Nursing Note:  Evelyn MCGOVERN Amelia presents today for Aranesp- NOT NEEDED.    Patient seen by provider today: No   present during visit today: Not Applicable.    Note: N/A.      Intravenous Access:  No Intravenous access/labs at this visit.    Treatment Conditions:  Lab Results   Component Value Date    HGB 11.1 (L) 03/06/2025    WBC 7.3 03/06/2025    ANEU 2.9 01/14/2016     03/06/2025        Results reviewed, labs did NOT meet treatment parameters: Hgb > 11.0      Post Infusion Assessment:  Not applicable.       Discharge Plan:   Patient discharged in stable condition accompanied by: self.  Departure Mode: Ambulatory.      Melina Cunningham LPN

## 2025-03-10 ENCOUNTER — TELEPHONE (OUTPATIENT)
Dept: NEPHROLOGY | Facility: CLINIC | Age: 60
End: 2025-03-10

## 2025-03-10 ENCOUNTER — OFFICE VISIT (OUTPATIENT)
Dept: NEPHROLOGY | Facility: CLINIC | Age: 60
End: 2025-03-10
Payer: MEDICARE

## 2025-03-10 VITALS — HEART RATE: 79 BPM | SYSTOLIC BLOOD PRESSURE: 112 MMHG | DIASTOLIC BLOOD PRESSURE: 68 MMHG | OXYGEN SATURATION: 94 %

## 2025-03-10 DIAGNOSIS — I12.9 CKD STAGE 4 SECONDARY TO HYPERTENSION (H): Primary | ICD-10-CM

## 2025-03-10 DIAGNOSIS — R80.9 ALBUMINURIA: ICD-10-CM

## 2025-03-10 DIAGNOSIS — L30.9 DERMATITIS: ICD-10-CM

## 2025-03-10 DIAGNOSIS — D63.1 ANEMIA IN STAGE 4 CHRONIC KIDNEY DISEASE (H): ICD-10-CM

## 2025-03-10 DIAGNOSIS — N18.4 CKD STAGE 4 SECONDARY TO HYPERTENSION (H): Primary | ICD-10-CM

## 2025-03-10 DIAGNOSIS — I10 HYPERTENSION, ESSENTIAL: ICD-10-CM

## 2025-03-10 DIAGNOSIS — N18.4 ANEMIA IN STAGE 4 CHRONIC KIDNEY DISEASE (H): ICD-10-CM
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
## 2025-03-10 NOTE — PATIENT INSTRUCTIONS
Refer to dermatology for rash.   Continue good hydration, low sodium diet .  Check blood pressure daily, keep log. Call our clinic if top number is 105 or less.   Avoid ibuprofen/aleve.   Call urology about flank pain and urine test suspicious for infection.   Labs in May and follow up in August with Dr. Blood.

## 2025-03-10 NOTE — TELEPHONE ENCOUNTER
[facility] Call Center    Phone Message    May a detailed message be left on voicemail: yes     Reason for Call: Medication Refill Request    Has the patient contacted the pharmacy for the refill? Yes   Name of medication being requested: farxiga 10 mg once daily  pt has been taking and getting it in a pill pac  Provider who prescribed the medication: Caitie  Pharmacy: [facility]   Date medication is needed: asap   Action Taken: Other: neph    Travel Screening: Not Applicable     Date of Service:

## 2025-03-11 NOTE — CONFIDENTIAL NOTE
"Per visit 10/7/24 and visit 3/10/25 with Analia ADAMS:    \"she was  Farxiga 10 mg daily but developed a urine infection. After discussion with the patient, it was stopped previously\"    Pharmacy called to inform them that Farxiga has been discontinued. Pharmacy tech stated a note has been made for no further refills.  KERI Watkins Care Coordinator  Nephrology    "

## 2025-03-20 ENCOUNTER — PATIENT OUTREACH (OUTPATIENT)
Dept: CARE COORDINATION | Facility: CLINIC | Age: 60
End: 2025-03-20

## 2025-03-20 ENCOUNTER — INFUSION THERAPY VISIT (OUTPATIENT)
Dept: INFUSION THERAPY | Facility: CLINIC | Age: 60
End: 2025-03-20
Attending: INTERNAL MEDICINE
Payer: MEDICARE

## 2025-03-20 DIAGNOSIS — I12.9 CKD STAGE 4 SECONDARY TO HYPERTENSION (H): ICD-10-CM

## 2025-03-20 DIAGNOSIS — N18.4 CKD STAGE 4 SECONDARY TO HYPERTENSION (H): ICD-10-CM

## 2025-03-20 DIAGNOSIS — N18.4 ANEMIA OF CHRONIC RENAL FAILURE, STAGE 4 (SEVERE) (H): Primary | ICD-10-CM

## 2025-03-20 DIAGNOSIS — D63.1 ANEMIA OF CHRONIC RENAL FAILURE, STAGE 4 (SEVERE) (H): Primary | ICD-10-CM

## 2025-03-20 DIAGNOSIS — N18.4 CKD (CHRONIC KIDNEY DISEASE) STAGE 4, GFR 15-29 ML/MIN (H): Primary | ICD-10-CM

## 2025-03-20 LAB
HCT VFR BLD AUTO: 36.2 % (ref 35–47)
HGB BLD-MCNC: 11.4 G/DL (ref 11.7–15.7)
HOLD SPECIMEN: NORMAL
HOLD SPECIMEN: NORMAL
VIT D+METAB SERPL-MCNC: 44 NG/ML (ref 20–50)

## 2025-03-20 RX ORDER — METHYLPREDNISOLONE SODIUM SUCCINATE 40 MG/ML
40 INJECTION INTRAMUSCULAR; INTRAVENOUS
Start: 2025-03-21

## 2025-03-20 RX ORDER — DIPHENHYDRAMINE HYDROCHLORIDE 50 MG/ML
25 INJECTION, SOLUTION INTRAMUSCULAR; INTRAVENOUS
Start: 2025-03-21

## 2025-03-20 RX ORDER — ALBUTEROL SULFATE 0.83 MG/ML
2.5 SOLUTION RESPIRATORY (INHALATION)
OUTPATIENT
Start: 2025-03-21

## 2025-03-20 RX ORDER — DIPHENHYDRAMINE HYDROCHLORIDE 50 MG/ML
50 INJECTION, SOLUTION INTRAMUSCULAR; INTRAVENOUS
Start: 2025-03-21

## 2025-03-20 RX ORDER — EPINEPHRINE 1 MG/ML
0.3 INJECTION, SOLUTION INTRAMUSCULAR; SUBCUTANEOUS EVERY 5 MIN PRN
OUTPATIENT
Start: 2025-03-21

## 2025-03-20 RX ORDER — MEPERIDINE HYDROCHLORIDE 25 MG/ML
25 INJECTION INTRAMUSCULAR; INTRAVENOUS; SUBCUTANEOUS
OUTPATIENT
Start: 2025-03-21

## 2025-03-20 RX ORDER — ALBUTEROL SULFATE 90 UG/1
1-2 INHALANT RESPIRATORY (INHALATION)
Start: 2025-03-21

## 2025-03-20 NOTE — PROGRESS NOTES
Anemia Management Note - Follow Up      SUBJECTIVE/OBJECTIVE:    Referred by Dr. Nathan Blood on 2024  Primary Diagnosis: Anemia in Chronic Kidney Disease (N18.4, D63.1)     Secondary Diagnosis:  Chronic Kidney Disease, Stage 4 (N18.4)   Hgb goal range:  10-11 *range increased on 24 per Dr. Blood     Epo/Darbo: Aranesp 60mcg every 14 days for Hgb <11.0. In Clinic.   Iron regimen:  Treat with IV Iron as needed.     *ok to stop oral Iron.  No improvement in Iron levels with Oral Iron.   *completed Injectafer on 24.      Labs : 2025  RX/TX plans : 7/10/2025     *staying in [place].      No history of stroke, MI, and blood clots or cancers.  Recent BRANDON use, transfusion, IV iron: NA     Contact:            No Consent to Communicate on File.        Latest Ref Rng & Units 2024 2025 2025 2025 2025 3/6/2025 3/20/2025   Anemia   HGB Goal      10 - 11     BRANDON Dose      60 mcg     Hemoglobin 11.7 - 15.7 g/dL 11.6  12.6  11.2  11.0  10.7  11.1  11.4    TSAT 15 - 46 %  35         Ferritin 11 - 328 ng/mL  389           BP Readings from Last 3 Encounters:   03/10/25 112/68   25 112/70   24 133/87     Wt Readings from Last 2 Encounters:   25 84.8 kg (187 lb)   10/07/24 85.3 kg (188 lb)           ASSESSMENT:    Hgb:Above goal - recommend hold dose  TSat: at goal >30% Ferritin: At goal (>100ng/mL)        PLAN:  Hold Aranesp.  RTC for hgb then Aranesp if needed in 2 week(s). Aranesp is scheduled for 4/3 & .     Orders needed to be renewed (for next follow-up date) in EPIC: None    Iron labs due:  2025    Plan discussed with:  No call, chart review       NEXT FOLLOW-UP DATE:  25    Archana Salamanca RN   Anemia Services  [facility]  [email or web address]   Office : [phone]  Fax: [phone]

## 2025-03-20 NOTE — PROGRESS NOTES
Infusion Nursing Note:  Evelyn MCGOVERN Amelia presents today for Aranesp- NOT NEEDED.    Patient seen by provider today: No   present during visit today: Not Applicable.    Note: N/A.      Intravenous Access:  No Intravenous access/labs at this visit.    Treatment Conditions:  Lab Results   Component Value Date    HGB 11.4 (L) 03/20/2025    WBC 7.3 03/06/2025    ANEU 2.9 01/14/2016     03/06/2025        Results reviewed, labs did NOT meet treatment parameters: Hgb > 11.0      Post Infusion Assessment:  Not applicable.       Discharge Plan:   Patient discharged in stable condition accompanied by: self.  Departure Mode: Ambulatory.      Melina Cunningham LPN

## 2025-04-03 ENCOUNTER — INFUSION THERAPY VISIT (OUTPATIENT)
Dept: INFUSION THERAPY | Facility: CLINIC | Age: 60
End: 2025-04-03
Attending: INTERNAL MEDICINE
Payer: MEDICARE

## 2025-04-03 DIAGNOSIS — D63.1 ANEMIA OF CHRONIC RENAL FAILURE, STAGE 4 (SEVERE) (H): Primary | ICD-10-CM

## 2025-04-03 DIAGNOSIS — N18.4 ANEMIA OF CHRONIC RENAL FAILURE, STAGE 4 (SEVERE) (H): Primary | ICD-10-CM

## 2025-04-03 DIAGNOSIS — N18.4 CKD (CHRONIC KIDNEY DISEASE) STAGE 4, GFR 15-29 ML/MIN (H): ICD-10-CM

## 2025-04-03 DIAGNOSIS — N18.4 ANEMIA OF CHRONIC RENAL FAILURE, STAGE 4 (SEVERE) (H): ICD-10-CM

## 2025-04-03 DIAGNOSIS — D63.1 ANEMIA OF CHRONIC RENAL FAILURE, STAGE 4 (SEVERE) (H): ICD-10-CM

## 2025-04-03 LAB
FERRITIN SERPL-MCNC: 351 NG/ML (ref 11–328)
HCT VFR BLD AUTO: 35.9 % (ref 35–47)
HGB BLD-MCNC: 11.7 G/DL (ref 11.7–15.7)
HOLD SPECIMEN: NORMAL
IRON BINDING CAPACITY (ROCHE): 197 UG/DL (ref 240–430)
IRON SATN MFR SERPL: 38 % (ref 15–46)
IRON SERPL-MCNC: 75 UG/DL (ref 37–145)

## 2025-04-03 PROCEDURE — 82728 ASSAY OF FERRITIN: CPT

## 2025-04-03 PROCEDURE — 85018 HEMOGLOBIN: CPT

## 2025-04-03 PROCEDURE — 83550 IRON BINDING TEST: CPT

## 2025-04-03 PROCEDURE — 83540 ASSAY OF IRON: CPT

## 2025-04-07 ENCOUNTER — PATIENT OUTREACH (OUTPATIENT)
Dept: NEPHROLOGY | Facility: CLINIC | Age: 60
End: 2025-04-07
Payer: MEDICARE

## 2025-04-07 NOTE — TELEPHONE ENCOUNTER
Spoke with patient regarding her cancelled appointment with Dr Blood. She expressed frustration. Upon reviewing Dr Blood's schedule there is a hold on 5/13 not any one specific patient.   Assisted patient in scheduling a follow up as she has been seeing Analia ADAMS the last year and is due for a follow up with Dr Blood. Appointment scheduled for 5/13 with Dr Blood. Patient stated appreciation.  KERI Watkins Care Coordinator  Nephrology

## 2025-04-09 PROBLEM — F40.01: Status: ACTIVE | Noted: 2022-10-19

## 2025-04-09 PROBLEM — G47.33 OBSTRUCTIVE SLEEP APNEA SYNDROME: Status: ACTIVE | Noted: 2022-09-18

## 2025-04-09 PROBLEM — M80.00XA OSTEOPOROSIS WITH CURRENT PATHOLOGICAL FRACTURE: Status: ACTIVE | Noted: 2021-01-06

## 2025-04-09 PROBLEM — N39.0 RECURRENT URINARY TRACT INFECTION: Status: ACTIVE | Noted: 2022-09-18

## 2025-04-09 PROBLEM — M48.061 SPINAL STENOSIS OF LUMBAR REGION AT MULTIPLE LEVELS: Status: ACTIVE | Noted: 2023-09-08

## 2025-04-09 PROBLEM — E11.42 POLYNEUROPATHY DUE TO TYPE 2 DIABETES MELLITUS (H): Status: ACTIVE | Noted: 2022-09-18

## 2025-04-09 PROBLEM — N32.81 OVERACTIVE BLADDER: Status: ACTIVE | Noted: 2023-09-18

## 2025-04-09 RX ORDER — PAROXETINE HCL 25 MG
25 TABLET, EXTENDED RELEASE 24 HR ORAL EVERY MORNING
COMMUNITY
Start: 2025-03-06

## 2025-04-17 ENCOUNTER — INFUSION THERAPY VISIT (OUTPATIENT)
Dept: INFUSION THERAPY | Facility: CLINIC | Age: 60
End: 2025-04-17
Attending: INTERNAL MEDICINE
Payer: MEDICARE

## 2025-04-17 ENCOUNTER — PATIENT OUTREACH (OUTPATIENT)
Dept: CARE COORDINATION | Facility: CLINIC | Age: 60
End: 2025-04-17

## 2025-04-17 VITALS — HEART RATE: 66 BPM | OXYGEN SATURATION: 96 % | DIASTOLIC BLOOD PRESSURE: 65 MMHG | SYSTOLIC BLOOD PRESSURE: 105 MMHG

## 2025-04-17 DIAGNOSIS — D63.1 ANEMIA OF CHRONIC RENAL FAILURE, STAGE 4 (SEVERE) (H): ICD-10-CM

## 2025-04-17 DIAGNOSIS — D63.1 ANEMIA OF CHRONIC RENAL FAILURE, STAGE 4 (SEVERE) (H): Primary | ICD-10-CM

## 2025-04-17 DIAGNOSIS — N18.4 ANEMIA OF CHRONIC RENAL FAILURE, STAGE 4 (SEVERE) (H): ICD-10-CM

## 2025-04-17 DIAGNOSIS — N18.4 ANEMIA OF CHRONIC RENAL FAILURE, STAGE 4 (SEVERE) (H): Primary | ICD-10-CM

## 2025-04-17 DIAGNOSIS — N18.4 CKD (CHRONIC KIDNEY DISEASE) STAGE 4, GFR 15-29 ML/MIN (H): ICD-10-CM

## 2025-04-17 LAB
FERRITIN SERPL-MCNC: 448 NG/ML (ref 11–328)
HCT VFR BLD AUTO: 31.8 % (ref 35–47)
HGB BLD-MCNC: 10.4 G/DL (ref 11.7–15.7)
HOLD SPECIMEN: NORMAL
IRON BINDING CAPACITY (ROCHE): 199 UG/DL (ref 240–430)
IRON SATN MFR SERPL: 28 % (ref 15–46)
IRON SERPL-MCNC: 55 UG/DL (ref 37–145)

## 2025-04-17 PROCEDURE — 83550 IRON BINDING TEST: CPT

## 2025-04-17 PROCEDURE — 96372 THER/PROPH/DIAG INJ SC/IM: CPT | Performed by: INTERNAL MEDICINE

## 2025-04-17 PROCEDURE — 82728 ASSAY OF FERRITIN: CPT

## 2025-04-17 PROCEDURE — 250N000011 HC RX IP 250 OP 636: Mod: JZ | Performed by: INTERNAL MEDICINE

## 2025-04-17 PROCEDURE — 85014 HEMATOCRIT: CPT

## 2025-04-17 RX ORDER — DIPHENHYDRAMINE HYDROCHLORIDE 50 MG/ML
50 INJECTION, SOLUTION INTRAMUSCULAR; INTRAVENOUS
Start: 2025-04-18

## 2025-04-17 RX ORDER — MEPERIDINE HYDROCHLORIDE 25 MG/ML
25 INJECTION INTRAMUSCULAR; INTRAVENOUS; SUBCUTANEOUS
OUTPATIENT
Start: 2025-04-18

## 2025-04-17 RX ORDER — EPINEPHRINE 1 MG/ML
0.3 INJECTION, SOLUTION INTRAMUSCULAR; SUBCUTANEOUS EVERY 5 MIN PRN
OUTPATIENT
Start: 2025-04-18

## 2025-04-17 RX ORDER — METHYLPREDNISOLONE SODIUM SUCCINATE 40 MG/ML
40 INJECTION INTRAMUSCULAR; INTRAVENOUS
Start: 2025-04-18

## 2025-04-17 RX ORDER — DIPHENHYDRAMINE HYDROCHLORIDE 50 MG/ML
25 INJECTION, SOLUTION INTRAMUSCULAR; INTRAVENOUS
Start: 2025-04-18

## 2025-04-17 RX ORDER — ALBUTEROL SULFATE 90 UG/1
1-2 INHALANT RESPIRATORY (INHALATION)
Start: 2025-04-18

## 2025-04-17 RX ORDER — ALBUTEROL SULFATE 0.83 MG/ML
2.5 SOLUTION RESPIRATORY (INHALATION)
OUTPATIENT
Start: 2025-04-18

## 2025-04-17 RX ADMIN — DARBEPOETIN ALFA 60 MCG: 60 INJECTION, SOLUTION INTRAVENOUS; SUBCUTANEOUS at 14:08

## 2025-04-17 ASSESSMENT — PAIN SCALES - GENERAL: PAINLEVEL_OUTOF10: SEVERE PAIN (7)

## 2025-04-17 NOTE — PROGRESS NOTES
Infusion Nursing Note:  Evelyn MCGOVERN Amelia presents today for Aranesp.    Patient seen by provider today: No   present during visit today: Not Applicable.    Note: Refer to flowsheets.      Intravenous Access:  Labs drawn without difficulty.    Treatment Conditions:  Lab Results   Component Value Date    HGB 10.4 (L) 04/17/2025    WBC 7.3 03/06/2025    ANEU 2.9 01/14/2016     03/06/2025            Post Infusion Assessment:  Patient tolerated injection without incident.  Site patent and intact, free from redness, edema or discomfort.       Discharge Plan:   Patient discharged in stable condition accompanied by: self.  Departure Mode: Ambulatory with cane.      Hailey Tsai RN

## 2025-04-17 NOTE — PROGRESS NOTES
Anemia Management Note - Follow Up      SUBJECTIVE/OBJECTIVE:    Referred by Dr. Nathan Blood on 2024  Primary Diagnosis: Anemia in Chronic Kidney Disease (N18.4, D63.1)     Secondary Diagnosis:  Chronic Kidney Disease, Stage 4 (N18.4)   Hgb goal range:  10-11 *range increased on 24 per Dr. Blood     Epo/Darbo: Aranesp 60mcg every 14 days for Hgb <11.0. In Clinic.   Iron regimen:  Treat with IV Iron as needed.     *ok to stop oral Iron.  No improvement in Iron levels with Oral Iron.   *completed Injectafer on 24.      Labs : 2025  RX/TX plans : 7/10/2025     *staying in [place].      No history of stroke, MI, and blood clots or cancers.  Recent BRANDON use, transfusion, IV iron: NA     Contact:            No Consent to Communicate on File.        Latest Ref Rng & Units 2025 2025 2025 3/6/2025 3/20/2025 4/3/2025 2025   Anemia   HGB Goal    10 - 11    10 - 11   BRANDON Dose    60 mcg    60 mcg   Hemoglobin 11.7 - 15.7 g/dL 11.2  11.0  10.7  11.1  11.4  11.7  10.4    TSAT 15 - 46 %      38  28    Ferritin 11 - 328 ng/mL      351  448      BP Readings from Last 3 Encounters:   25 105/65   03/10/25 112/68   25 112/70     Wt Readings from Last 2 Encounters:   25 84.8 kg (187 lb)   10/07/24 85.3 kg (188 lb)           ASSESSMENT:    Hgb:at goal - received dose in clinic - recommend continue current regimen  TSat: not at goal of >30% Ferritin: At goal (>100ng/mL)   Goals Addressed    None         PLAN:  Dose with Aranesp.  RTC for hgb then Aranesp if needed in 2 week(s).    Orders needed to be renewed (for next follow-up date) in EPIC: None    Iron labs due:  3 months    Plan discussed with:  No call, chart review      NEXT FOLLOW-UP DATE:  25    Archana Salamanca RN   Anemia Services  [email or web address]   Office : [phone]  Fax: [phone]

## 2025-04-19 ENCOUNTER — HEALTH MAINTENANCE LETTER (OUTPATIENT)
Age: 60
End: 2025-04-19

## 2025-04-24 ENCOUNTER — OFFICE VISIT (OUTPATIENT)
Dept: PODIATRY | Facility: CLINIC | Age: 60
End: 2025-04-24
Payer: MEDICARE

## 2025-04-24 VITALS — WEIGHT: 183.2 LBS | HEIGHT: 62 IN | BODY MASS INDEX: 33.71 KG/M2

## 2025-04-24 DIAGNOSIS — N18.30 DIABETES MELLITUS DUE TO UNDERLYING CONDITION WITH STAGE 3 CHRONIC KIDNEY DISEASE, UNSPECIFIED WHETHER LONG TERM INSULIN USE, UNSPECIFIED WHETHER STAGE 3A OR 3B CKD (H): Primary | ICD-10-CM

## 2025-04-24 DIAGNOSIS — R23.4 FISSURE IN SKIN OF FOOT: ICD-10-CM

## 2025-04-24 DIAGNOSIS — E08.22 DIABETES MELLITUS DUE TO UNDERLYING CONDITION WITH STAGE 3 CHRONIC KIDNEY DISEASE, UNSPECIFIED WHETHER LONG TERM INSULIN USE, UNSPECIFIED WHETHER STAGE 3A OR 3B CKD (H): Primary | ICD-10-CM

## 2025-04-24 PROBLEM — S22.000A COMPRESSION FRACTURE OF BODY OF THORACIC VERTEBRA (H): Status: ACTIVE | Noted: 2021-01-04

## 2025-04-24 PROBLEM — K59.03 DRUG-INDUCED CONSTIPATION: Status: ACTIVE | Noted: 2023-09-18

## 2025-04-24 PROBLEM — J45.20 INTRINSIC ASTHMA WITHOUT STATUS ASTHMATICUS: Status: ACTIVE | Noted: 2022-10-19

## 2025-04-24 PROBLEM — S34.109A CLOSED FRACTURE OF LUMBAR VERTEBRA WITH SPINAL CORD INJURY (H): Status: ACTIVE | Noted: 2022-10-19

## 2025-04-24 PROBLEM — S32.008A CLOSED FRACTURE OF LUMBAR VERTEBRA WITH SPINAL CORD INJURY (H): Status: ACTIVE | Noted: 2022-10-19

## 2025-04-24 PROBLEM — M80.80XA: Status: ACTIVE | Noted: 2022-10-19

## 2025-04-24 PROBLEM — S32.020A COMPRESSION FRACTURE OF L2 LUMBAR VERTEBRA, CLOSED, INITIAL ENCOUNTER (H): Status: ACTIVE | Noted: 2021-01-04

## 2025-04-24 PROBLEM — G47.00 INSOMNIA: Status: ACTIVE | Noted: 2023-09-18

## 2025-04-24 PROBLEM — E78.5 HYPERLIPIDEMIA: Status: ACTIVE | Noted: 2022-09-18

## 2025-04-24 NOTE — PROGRESS NOTES
Subjective:    Pt is seen today for diabetic foot exam.  She has peripheral neuropathy.  Denies history of foot ulcers.  She has had fissures in both fifth toes.  These wax and wane.  Currently the she does not have these nor is there any drainage.  Denies any erythema ecchymosis purulence odor or drainage.  Admits her blood sugars run high at sometimes.  She has chronic kidney disease stage IV.  History of severe low back pathology with past history of surgery.  Also wondering about nail debridement.    ROS: See above         Allergies   Allergen Reactions    Adhesive Tape Rash    Amlodipine Other (See Comments) and Swelling    Amoxicillin-Pot Clavulanate Nausea and Difficulty breathing     Chest pain    Cefzil [Cefprozil] Nausea and Vomiting    Codeine Nausea and Vomiting    Contrast Dye Hives    Diatrizoate Hives    Latex Hives    Losartan Unknown and Other (See Comments)     Other Reaction(s): falls/ dizzy?    Morphine Unknown    Polyethylene Glycol     Tuberculin, Ppd Hives    Zolpidem Other (See Comments)    Cefdinir Itching and Rash    Medrol [Methylprednisolone] Hives, Difficulty breathing and Rash    Sulfa Antibiotics Other (See Comments), Palpitations, Rash and Unknown       Current Outpatient Medications   Medication Sig Dispense Refill    Acetaminophen (TYLENOL PO) Take 1,000 mg by mouth every 8 hours as needed for mild pain or fever      albuterol (2.5 MG/3ML) 0.083% nebulizer solution Take 1 vial (2.5 mg) by nebulization every 6 hours as needed for shortness of breath / dyspnea or wheezing 1 Box 1    artificial tears OINT ophthalmic ointment Place into both eyes at bedtime.      aspirin (ASA) 81 MG chewable tablet Take 81 mg by mouth daily      carvedilol (COREG) 12.5 MG tablet Take 12.5 mg by mouth 2 times daily (with meals)      Cetirizine HCl (ZYRTEC ALLERGY PO) Take 10 mg by mouth every evening       conjugated estrogens (PREMARIN) 0.625 MG/GM vaginal cream Place 1.5 g vaginally twice a week 3 g 3     Cyclobenzaprine HCl (FLEXERIL PO) Take 10 mg by mouth 3 times daily as needed for muscle spasms      hydrOXYzine HCl (ATARAX) 25 MG tablet 1 tablet as needed Orally three times daily      isosorbide mononitrate (IMDUR) 30 MG 24 hr tablet Take 30 mg by mouth daily      LORAZEPAM PO Take 1 mg by mouth 2 times daily as needed for anxiety      Montelukast Sodium (SINGULAIR PO) Take 10 mg by mouth At Bedtime      NARCAN 4 MG/0.1ML nasal spray Spray 1 spray in nostril      nystatin (MYCOSTATIN) 334784 UNIT/ML suspension  ML OF NYSTATIN MIXED WITH LIDOCAINE FOR A TOTAL  ML, SWISH AND SPIT 5 ML UP TO 6 TIMES DAILY      olopatadine (PATADAY) 0.2 % ophthalmic solution Place 1 drop into both eyes 2 times daily as needed      PARoxetine HCl (PAXIL PO) Take 30 mg by mouth every evening Take  50 mg daily      PAXIL CR 25 MG 24 hr tablet Take 25 mg by mouth every morning.      rosuvastatin (CRESTOR) 10 MG tablet Take 1 tablet by mouth daily      senna-docusate (SENOKOT-S/PERICOLACE) 8.6-50 MG tablet Take 2 tablets by mouth daily      TRADJENTA 5 MG TABS tablet Take 5 mg by mouth      triamcinolone (KENALOG) 0.1 % external ointment APPLY A TIC TAC SIZE AMOUNT TO RIGHT EAR CANAL ONCE A DAY      UNABLE TO FIND 1 Units by IMPLANT route continuous MEDICATION NAME: hydromorphone, fentanyl, buvpivocaine      VENTOLIN  (90 Base) MCG/ACT inhaler USE 1 PUFF BY MOUTH AS NEEDED EVERY 4 HOURS 30      White Petrolatum-Mineral Oil (SYSTANE NIGHTTIME) OINT APPLY A SMALL AMOUNT INTO BOTH EYES AT BEDTIME         Patient Active Problem List   Diagnosis    Anemia of chronic renal failure, stage 4 (severe) (H)    CKD stage 4 secondary to hypertension (H)    Diabetes mellitus due to underlying condition with stage 3 chronic kidney disease, unspecified whether long term insulin use, unspecified whether stage 3a or 3b CKD (H)    Hypertension, essential    Severe episode of recurrent major depressive disorder (H)    Spinal stenosis  "of lumbar region at multiple levels    Social phobia    Severe obesity (H)    Recurrent urinary tract infection    Presence of implanted infusion pump    Torn meniscus    Polyneuropathy due to type 2 diabetes mellitus (H)    Panic disorder with agoraphobia, severe agoraphobic avoidance and mild panic attacks    Overactive bladder    Osteoporosis with current pathological fracture    Osteoarthritis of hip    Obstructive sleep apnea syndrome    Migraine headache    Insomnia    Hyperlipidemia    Goiter    Intrinsic asthma without status asthmaticus    Drug-induced constipation    Disuse osteoporosis with pathological fracture    Closed fracture of lumbar vertebra with spinal cord injury (H)    Compression fracture of body of thoracic vertebra (H)    Compression fracture of L2 lumbar vertebra, closed, initial encounter (H)    Chronic pain disorder    Agoraphobia with panic disorder    Degeneration of intervertebral disc of lumbosacral region       Past Medical History:   Diagnosis Date    Diabetes (H)     History of blood transfusion     Hypertension     Thyroid disease     Uncomplicated asthma        Past Surgical History:   Procedure Laterality Date    ABDOMEN SURGERY      APPENDECTOMY      BACK SURGERY      CHOLECYSTECTOMY      COSMETIC SURGERY      ORTHOPEDIC SURGERY         Family History   Problem Relation Age of Onset    Thyroid Disease Mother        Social History     Tobacco Use    Smoking status: Never    Smokeless tobacco: Not on file   Substance Use Topics    Alcohol use: Not Currently         Exam:    Vitals: Ht 1.581 m (5' 2.25\")   Wt 83.1 kg (183 lb 3.2 oz)   BMI 33.24 kg/m    BMI: Body mass index is 33.24 kg/m .  Height: 5' 2.25\"    Constitutional/ general:  Pt is in no apparent distress, appears well-nourished.  Cooperative with history and physical exam.     Psych:  The patient answered questions appropriately.  Normal affect.  Seems to have reasonable expectations, in terms of treatment.     Lungs:  " Non labored breathing, non labored speech. No cough.  No audible wheezing. Even, quiet breathing.       Vascular:  positive pedal pulses bilaterally for both the DP and PT arteries.  CFT < 3 sec.  positive ankle edema.  positive pedal hair growth.    Neuro:  Alert and oriented x 3. Coordinated gait.  Light touch sensation is diminished on toes.  Monofilament hit and miss on toes.  Intact on midfoot.    Derm: Somewhat thin and shiny.    Musculoskeletal:    Lower extremity muscle strength is normal.  Patient is ambulatory without an assistive device or brace.  No gross forefoot deformities noted.  Classic fifth toe hammering with varus rotation close to fourth toe.  We note right fourth inner space somewhat macerated at this time.  No fissures or skin breakdown.  No erythema or edema.  Feet otherwise healthy.  Nails elongated.    A:  Diabetes mellitus with peripheral neuropathy and LOPS  History of fourth interspace maceration and fissures    P:  Discussed the cause of fissured skin with patient.  Discussed importance of keeping this dry.  Will use lambswool in interspaces and instructed her how to do this.  Explained protective sensation mostly intact.  Will watch feet daily and keep these protected.  Encourage good blood sugar control.  If she notices any problems with they will contact me immediately.  Explained I do not trim nails in clinic.  Will refer to foot care nurse.  RTC as needed.    Jairo Kerns DPM, SUSANNE

## 2025-04-24 NOTE — LETTER
4/24/2025      Evelyn Cisneros  [address] [id number] [id number]  [place] [address]      Dear Colleague,    Thank you for referring your patient, Evelyn Cisneros, to the [facility]. Please see a copy of my visit note below.    Subjective:    Pt is seen today for diabetic foot exam.  She has peripheral neuropathy.  Denies history of foot ulcers.  She has had fissures in both fifth toes.  These wax and wane.  Currently the she does not have these nor is there any drainage.  Denies any erythema ecchymosis purulence odor or drainage.  Admits her blood sugars run high at sometimes.  She has chronic kidney disease stage IV.  History of severe low back pathology with past history of surgery.  Also wondering about nail debridement.    ROS: See above         Allergies   Allergen Reactions     Adhesive Tape Rash     Amlodipine Other (See Comments) and Swelling     Amoxicillin-Pot Clavulanate Nausea and Difficulty breathing     Chest pain     Cefzil [Cefprozil] Nausea and Vomiting     Codeine Nausea and Vomiting     Contrast Dye Hives     Diatrizoate Hives     Latex Hives     Losartan Unknown and Other (See Comments)     Other Reaction(s): falls/ dizzy?     Morphine Unknown     Polyethylene Glycol      Tuberculin, Ppd Hives     Zolpidem Other (See Comments)     Cefdinir Itching and Rash     Medrol [Methylprednisolone] Hives, Difficulty breathing and Rash     Sulfa Antibiotics Other (See Comments), Palpitations, Rash and Unknown       Current Outpatient Medications   Medication Sig Dispense Refill     Acetaminophen (TYLENOL PO) Take 1,000 mg by mouth every 8 hours as needed for mild pain or fever       albuterol (2.5 MG/3ML) 0.083% nebulizer solution Take 1 vial (2.5 mg) by nebulization every 6 hours as needed for shortness of breath / dyspnea or wheezing 1 Box 1     artificial tears OINT ophthalmic ointment Place into both eyes at bedtime.       aspirin (ASA) 81 MG chewable tablet Take 81 mg by mouth daily        carvedilol (COREG) 12.5 MG tablet Take 12.5 mg by mouth 2 times daily (with meals)       Cetirizine HCl (ZYRTEC ALLERGY PO) Take 10 mg by mouth every evening        conjugated estrogens (PREMARIN) 0.625 MG/GM vaginal cream Place 1.5 g vaginally twice a week 3 g 3     Cyclobenzaprine HCl (FLEXERIL PO) Take 10 mg by mouth 3 times daily as needed for muscle spasms       hydrOXYzine HCl (ATARAX) 25 MG tablet 1 tablet as needed Orally three times daily       isosorbide mononitrate (IMDUR) 30 MG 24 hr tablet Take 30 mg by mouth daily       LORAZEPAM PO Take 1 mg by mouth 2 times daily as needed for anxiety       Montelukast Sodium (SINGULAIR PO) Take 10 mg by mouth At Bedtime       NARCAN 4 MG/0.1ML nasal spray Spray 1 spray in nostril       nystatin (MYCOSTATIN) [id number] UNIT/ML suspension  ML OF NYSTATIN MIXED WITH LIDOCAINE FOR A TOTAL  ML, SWISH AND SPIT 5 ML UP TO 6 TIMES DAILY       olopatadine (PATADAY) 0.2 % ophthalmic solution Place 1 drop into both eyes 2 times daily as needed       PARoxetine HCl (PAXIL PO) Take 30 mg by mouth every evening Take  50 mg daily       PAXIL CR 25 MG 24 hr tablet Take 25 mg by mouth every morning.       rosuvastatin (CRESTOR) 10 MG tablet Take 1 tablet by mouth daily       senna-docusate (SENOKOT-S/PERICOLACE) 8.6-50 MG tablet Take 2 tablets by mouth daily       TRADJENTA 5 MG TABS tablet Take 5 mg by mouth       triamcinolone (KENALOG) 0.1 % external ointment APPLY A TIC TAC SIZE AMOUNT TO RIGHT EAR CANAL ONCE A DAY       UNABLE TO FIND 1 Units by IMPLANT route continuous MEDICATION NAME: hydromorphone, fentanyl, buvpivocaine       VENTOLIN  (90 Base) MCG/ACT inhaler USE 1 PUFF BY MOUTH AS NEEDED EVERY 4 HOURS 30       White Petrolatum-Mineral Oil (SYSTANE NIGHTTIME) OINT APPLY A SMALL AMOUNT INTO BOTH EYES AT BEDTIME         Patient Active Problem List   Diagnosis     Anemia of chronic renal failure, stage 4 (severe) (H)     CKD stage 4 secondary to hypertension  "(H)     Diabetes mellitus due to underlying condition with stage 3 chronic kidney disease, unspecified whether long term insulin use, unspecified whether stage 3a or 3b CKD (H)     Hypertension, essential     Severe episode of recurrent major depressive disorder (H)     Spinal stenosis of lumbar region at multiple levels     Social phobia     Severe obesity (H)     Recurrent urinary tract infection     Presence of implanted infusion pump     Torn meniscus     Polyneuropathy due to type 2 diabetes mellitus (H)     Panic disorder with agoraphobia, severe agoraphobic avoidance and mild panic attacks     Overactive bladder     Osteoporosis with current pathological fracture     Osteoarthritis of hip     Obstructive sleep apnea syndrome     Migraine headache     Insomnia     Hyperlipidemia     Goiter     Intrinsic asthma without status asthmaticus     Drug-induced constipation     Disuse osteoporosis with pathological fracture     Closed fracture of lumbar vertebra with spinal cord injury (H)     Compression fracture of body of thoracic vertebra (H)     Compression fracture of L2 lumbar vertebra, closed, initial encounter (H)     Chronic pain disorder     Agoraphobia with panic disorder     Degeneration of intervertebral disc of lumbosacral region       Past Medical History:   Diagnosis Date     Diabetes (H)      History of blood transfusion      Hypertension      Thyroid disease      Uncomplicated asthma        Past Surgical History:   Procedure Laterality Date     ABDOMEN SURGERY       APPENDECTOMY       BACK SURGERY       CHOLECYSTECTOMY       COSMETIC SURGERY       ORTHOPEDIC SURGERY         Family History   Problem Relation Age of Onset     Thyroid Disease Mother        Social History     Tobacco Use     Smoking status: Never     Smokeless tobacco: Not on file   Substance Use Topics     Alcohol use: Not Currently         Exam:    Vitals: Ht 1.581 m (5' 2.25\")   Wt 83.1 kg (183 lb 3.2 oz)   BMI 33.24 kg/m    BMI: " "Body mass index is 33.24 kg/m .  Height: 5' 2.25\"    Constitutional/ general:  Pt is in no apparent distress, appears well-nourished.  Cooperative with history and physical exam.     Psych:  The patient answered questions appropriately.  Normal affect.  Seems to have reasonable expectations, in terms of treatment.     Lungs:  Non labored breathing, non labored speech. No cough.  No audible wheezing. Even, quiet breathing.       Vascular:  positive pedal pulses bilaterally for both the DP and PT arteries.  CFT < 3 sec.  positive ankle edema.  positive pedal hair growth.    Neuro:  Alert and oriented x 3. Coordinated gait.  Light touch sensation is diminished on toes.  Monofilament hit and miss on toes.  Intact on midfoot.    Derm: Somewhat thin and shiny.    Musculoskeletal:    Lower extremity muscle strength is normal.  Patient is ambulatory without an assistive device or brace.  No gross forefoot deformities noted.  Classic fifth toe hammering with varus rotation close to fourth toe.  We note right fourth inner space somewhat macerated at this time.  No fissures or skin breakdown.  No erythema or edema.  Feet otherwise healthy.  Nails elongated.    A:  Diabetes mellitus with peripheral neuropathy and LOPS  History of fourth interspace maceration and fissures    P:  Discussed the cause of fissured skin with patient.  Discussed importance of keeping this dry.  Will use lambswool in interspaces and instructed her how to do this.  Explained protective sensation mostly intact.  Will watch feet daily and keep these protected.  Encourage good blood sugar control.  If she notices any problems with they will contact me immediately.  Explained I do not trim nails in clinic.  Will refer to foot care nurse.  RTC as needed.    Jairo Kerns DPM, FACFAS              Again, thank you for allowing me to participate in the care of your patient.        Sincerely,        Jairo Kerns DPM    Electronically signed"

## 2025-04-24 NOTE — PATIENT INSTRUCTIONS
We wish you continued good healing. If you have any questions or concerns, please do not hesitate to contact us at  [phone]    [email or web address] (secure e-mail communication and access to your chart) to send a message or to make an appointment.    Please remember to call and schedule a follow up appointment if one was recommended at your earliest convenience.     PODIATRY CLINIC HOURS  TELEPHONE NUMBER    Dr. Jairo Kerns D.P.M FACFAS        Clinics:  [place]  Tuesday 1PM-6PM  [place]  Wednesday 745AM-330PM  [place]  Monday 2nd,4th  830AM-4PM  Thursday/Friday 745AM-230PM     [facility]AM APPOINTMENTS  [phone]    [facility] [place] APPOINTMENTS  [phone]          If you need a medication refill, please contact us you may need lab work and/or a follow up visit prior to your refill (i.e. Antifungal medications).  If MRI needed please call Imaging at [phone]   HOW DO I GET MY KNEE SCOOTER? Knee scooters can be picked up at ANY Medical Supply stores with your knee scooter Prescription.  OR  Bring your signed prescription to an [facility] showroom.   Set up an appointment for your custom Orthotics. Call any Orthotics locations call [phone]         [facility](out of pocket)........................[phone]    They travel to the following Community/Senior Centers.   Need to call [facility] to set up appointments.    Senior Centers:  [place].......[phone]    [place]................[phone]    [place]-.....[phone]    [place]........................[phone]    [facility].................[phone]    Affordable Foot Care (in Home)    Leanne Box [phone]    The Foot Care Nurse    Ann Sethi RN, BSN, [phone]    [facility]....[phone]    The Foot Nurse-[facility]-----------[phone]    [facility]  Estephania Lopez, PADMINI [phone]    ** YOU MUST CALL FOR INFORMATION ON DAYS, TIMES AND COST OF SERVICE**    For up to date list of Foot Care Rn's. Visit the website    American Foot Care Nurses Association website    Afcna.org    Click on the \"Find a foot care provider\" Link    Katia Harley RN,[facility] [phone] (Text or call) Has limited home visit.    Bethany PackRN,[facility] [phone]    Gina Ladd [phone]    Ilda Gaytan,KERI,BSN,[facility] [phone]    Barb Guido,KERI [phone]    Tali Triplett [phone]    Emily Villalta [phone]    Alicia [phone]    **THIS IS A PRIVATE PAY SERVICE- NOT BILLABLE TO MEDICARE OR INSURANCE**     Routine Foot Care Referral List  [facility]  [phone]  [facility]  [phone]  [facility]  Podiatrist   Richi Dillon DPM  Location:  [address], [address], [place] [address]  Phone: [phone]    [facility]  Podiatrist  Mark Anthony Hall DPM  Locations:  [address], [address], [place] [address]- [phone]  [place]  [address], [address], [place] [address]- [phone]  [place]  [address], [address], [place] [address]- [phone]    Foot & Ankle Physician [facility]  Podiatrist  Fernandez Hodgson DPM  Locations:  [place] [id number], [address], [place] [address]- [phone]  [place]  [address] [place] [address] [address], [place] [address]- [phone]      [facility]  Podiatrists  SHERRY Edmonds DPM  Location  [place] [address], [address], [place] [address]-  [phone]    [facility]  Podiatrist  Cezar Prado DPM  Location  [address] [address]-  [phone]    Richi Longoria DPM  Podiatrist   Richi Longoria DPM  Location  [address] [address]-  [phone]  Jakob [place] " [place] [address]-  [phone]    [facility]  Podiatrists  Junior Ng, DPALANNA Tidwell, DPALANNA [place], DPM  Locations  [place]  [address], [address], [place], [place] [address]- [phone]  [place]  [address], [address], [place] [address]- [phone]    [facility]  Podiatrists  Allan Noel, DPALANNA Oscar, DPALANNA Adame, DPALANNA Tanner, DPALANNA Turcios, DPALANNA Dewitt, DPALANNA Flaherty, DPM  Mg Tidwell, DPM  Ferny Mcknight, DPM  Locations  [place]  [address], [address], [place], [place] [address]- [phone]  [place]   [address],  [address], [place], [place] [address]- [phone]  [place]  [address], [address], [place] [address]-  [phone]  [place]  [address], [address], [place], [place] [address]- [phone]  [place]  [address], [address], [place], [place] [address]- [phone]    [facility]  Podiatrists  Markus Reich, DPALANNA Guaman, DPALANNA Lew, DPALANNA Lomax, DPALANNA Arellano, DPALANNA Turcios, DPM  Locations  [place]  [address], [address], [place], [place] [address]- [phone]  [place]  [address], [address], [place], [place] [address]- [phone]  [place]  [address], [place], [place] [address]- [phone]    [facility]  Podiatrist   Dequan Ellis, DPM  Location   [place]  [address], [address], [place], [place] [address]- [phone]    [facility]   Podiatrists  Fouzia Dumont, SHERRY Tong, STARRM  Location  [place]  [address], [place], [place] [address]- [phone]    [facility]  Podiatrist   Nikita Dockery, STARRM  Location  [place]  [address], [address], [place] [address]- [phone]      [facility]  Podiatrist   Nestor Armstrong,  DPM  Location  [address] [place] [address]-  [phone]

## 2025-04-29 ENCOUNTER — APPOINTMENT (OUTPATIENT)
Dept: URBAN - METROPOLITAN AREA CLINIC 252 | Age: 60
Setting detail: DERMATOLOGY
End: 2025-04-29

## 2025-04-29 VITALS — HEIGHT: 62 IN | WEIGHT: 181 LBS

## 2025-04-29 DIAGNOSIS — I87.2 VENOUS INSUFFICIENCY (CHRONIC) (PERIPHERAL): ICD-10-CM

## 2025-04-29 PROCEDURE — OTHER PRESCRIPTION: OTHER

## 2025-04-29 PROCEDURE — OTHER PRESCRIPTION MEDICATION MANAGEMENT: OTHER

## 2025-04-29 PROCEDURE — OTHER COUNSELING: OTHER

## 2025-04-29 RX ORDER — DESOXIMETASONE 2.5 MG/G
CREAM TOPICAL
Qty: 60 | Refills: 3 | Status: ERX | COMMUNITY
Start: 2025-04-29

## 2025-04-29 ASSESSMENT — LOCATION ZONE DERM: LOCATION ZONE: LEG

## 2025-04-29 ASSESSMENT — LOCATION DETAILED DESCRIPTION DERM
LOCATION DETAILED: RIGHT PROXIMAL PRETIBIAL REGION
LOCATION DETAILED: LEFT PROXIMAL PRETIBIAL REGION

## 2025-04-29 ASSESSMENT — LOCATION SIMPLE DESCRIPTION DERM
LOCATION SIMPLE: LEFT PRETIBIAL REGION
LOCATION SIMPLE: RIGHT PRETIBIAL REGION

## 2025-04-29 NOTE — PROCEDURE: PRESCRIPTION MEDICATION MANAGEMENT
Detail Level: Zone
Discontinue Regimen: Triamcinolone as prescribed by outside provider and Aquaphor as recommended by outside provider
Render In Strict Bullet Format?: No

## 2025-04-29 NOTE — HPI: RASH
How Severe Is Your Rash?: severe
Is This A New Presentation, Or A Follow-Up?: Rash
Additional History: Worse on left than right. Gettin worse and worse but better today. Has tries triamcinolone with 2 different potencies and neither has helped. No pain.  History of chronic UTIs. She stopped wearing compression stockings in January because she thought this was maybe from that.

## 2025-05-01 ENCOUNTER — LAB (OUTPATIENT)
Dept: INFUSION THERAPY | Facility: CLINIC | Age: 60
End: 2025-05-01
Attending: INTERNAL MEDICINE
Payer: MEDICARE

## 2025-05-01 DIAGNOSIS — D63.1 ANEMIA OF CHRONIC RENAL FAILURE, STAGE 4 (SEVERE) (H): Primary | ICD-10-CM

## 2025-05-01 DIAGNOSIS — N18.4 ANEMIA OF CHRONIC RENAL FAILURE, STAGE 4 (SEVERE) (H): ICD-10-CM

## 2025-05-01 DIAGNOSIS — N18.4 ANEMIA OF CHRONIC RENAL FAILURE, STAGE 4 (SEVERE) (H): Primary | ICD-10-CM

## 2025-05-01 DIAGNOSIS — D63.1 ANEMIA OF CHRONIC RENAL FAILURE, STAGE 4 (SEVERE) (H): ICD-10-CM

## 2025-05-01 DIAGNOSIS — N18.4 CKD (CHRONIC KIDNEY DISEASE) STAGE 4, GFR 15-29 ML/MIN (H): ICD-10-CM

## 2025-05-01 LAB
FERRITIN SERPL-MCNC: 352 NG/ML (ref 11–328)
HCT VFR BLD AUTO: 34.9 % (ref 35–47)
HGB BLD-MCNC: 11.1 G/DL (ref 11.7–15.7)
HOLD SPECIMEN: NORMAL
IRON BINDING CAPACITY (ROCHE): 192 UG/DL (ref 240–430)
IRON SATN MFR SERPL: 36 % (ref 15–46)
IRON SERPL-MCNC: 69 UG/DL (ref 37–145)

## 2025-05-01 PROCEDURE — 83550 IRON BINDING TEST: CPT

## 2025-05-01 PROCEDURE — 82728 ASSAY OF FERRITIN: CPT

## 2025-05-01 PROCEDURE — 85018 HEMOGLOBIN: CPT

## 2025-05-01 NOTE — PROGRESS NOTES
Infusion Nursing Note:  Evelyn MCGOVERN Amelia presents today for Aranesp.    Patient seen by provider today: No   present during visit today: Not Applicable.    Note: N/A.      Intravenous Access:  No Intravenous access/labs at this visit.    Treatment Conditions:  Lab Results   Component Value Date    HGB 11.1 (L) 05/01/2025    WBC 7.3 03/06/2025    ANEU 2.9 01/14/2016     03/06/2025        Results reviewed, labs did NOT meet treatment parameters: Parameters is less than 11,patient did not receive injection.      Post Infusion Assessment:  N/A.       Discharge Plan:   N/A.      Samantha Tobar MA

## 2025-05-11 ENCOUNTER — MYC MEDICAL ADVICE (OUTPATIENT)
Dept: NEPHROLOGY | Facility: CLINIC | Age: 60
End: 2025-05-11
Payer: MEDICARE

## 2025-05-12 ENCOUNTER — LAB (OUTPATIENT)
Dept: LAB | Facility: CLINIC | Age: 60
End: 2025-05-12
Payer: MEDICARE

## 2025-05-12 DIAGNOSIS — N18.4 CKD STAGE 4 SECONDARY TO HYPERTENSION (H): Primary | ICD-10-CM

## 2025-05-12 DIAGNOSIS — I12.9 CKD STAGE 4 SECONDARY TO HYPERTENSION (H): Primary | ICD-10-CM

## 2025-05-12 DIAGNOSIS — N18.4 CKD STAGE 4 SECONDARY TO HYPERTENSION (H): ICD-10-CM

## 2025-05-12 DIAGNOSIS — I12.9 CKD STAGE 4 SECONDARY TO HYPERTENSION (H): ICD-10-CM

## 2025-05-12 LAB
ALBUMIN UR-MCNC: 30 MG/DL
APPEARANCE UR: CLEAR
BACTERIA #/AREA URNS HPF: ABNORMAL /HPF
BILIRUB UR QL STRIP: NEGATIVE
COLOR UR AUTO: YELLOW
GLUCOSE UR STRIP-MCNC: 500 MG/DL
HGB BLD-MCNC: 11.1 G/DL (ref 11.7–15.7)
HGB UR QL STRIP: ABNORMAL
KETONES UR STRIP-MCNC: NEGATIVE MG/DL
LEUKOCYTE ESTERASE UR QL STRIP: NEGATIVE
NITRATE UR QL: NEGATIVE
PH UR STRIP: 6 [PH] (ref 5–7)
RBC #/AREA URNS AUTO: ABNORMAL /HPF
SP GR UR STRIP: 1.01 (ref 1–1.03)
SQUAMOUS #/AREA URNS AUTO: ABNORMAL /LPF
UROBILINOGEN UR STRIP-ACNC: 0.2 E.U./DL
WBC #/AREA URNS AUTO: ABNORMAL /HPF
YEAST #/AREA URNS HPF: ABNORMAL /HPF

## 2025-05-12 PROCEDURE — 82570 ASSAY OF URINE CREATININE: CPT

## 2025-05-12 PROCEDURE — 36415 COLL VENOUS BLD VENIPUNCTURE: CPT

## 2025-05-12 PROCEDURE — 80069 RENAL FUNCTION PANEL: CPT

## 2025-05-12 PROCEDURE — 84156 ASSAY OF PROTEIN URINE: CPT

## 2025-05-12 PROCEDURE — 81001 URINALYSIS AUTO W/SCOPE: CPT

## 2025-05-12 PROCEDURE — 82043 UR ALBUMIN QUANTITATIVE: CPT

## 2025-05-12 PROCEDURE — 85018 HEMOGLOBIN: CPT

## 2025-05-12 NOTE — TELEPHONE ENCOUNTER
Called and spoke with pt. Apologized for Dr. Blood's future labs orders were not in her chart at the time of her lab appt.    Future lab orders placed  Assisted pt with schedule future appt per pt request, today at [facility] in [place].    Pt had no further questions.    Jackie Zayas LPN

## 2025-05-13 ENCOUNTER — OFFICE VISIT (OUTPATIENT)
Dept: NEPHROLOGY | Facility: CLINIC | Age: 60
End: 2025-05-13
Payer: MEDICARE

## 2025-05-13 ENCOUNTER — RESULTS FOLLOW-UP (OUTPATIENT)
Dept: NEPHROLOGY | Facility: CLINIC | Age: 60
End: 2025-05-13

## 2025-05-13 VITALS
BODY MASS INDEX: 34.16 KG/M2 | WEIGHT: 188.3 LBS | HEART RATE: 73 BPM | DIASTOLIC BLOOD PRESSURE: 78 MMHG | OXYGEN SATURATION: 94 % | SYSTOLIC BLOOD PRESSURE: 123 MMHG

## 2025-05-13 DIAGNOSIS — N25.81 SECONDARY RENAL HYPERPARATHYROIDISM: Primary | ICD-10-CM

## 2025-05-13 DIAGNOSIS — E84.0 CYSTIC FIBROSIS OF THE LUNG (H): ICD-10-CM

## 2025-05-13 LAB
ALBUMIN MFR UR ELPH: 30.1 MG/DL
ALBUMIN SERPL BCG-MCNC: 4.2 G/DL (ref 3.5–5.2)
ANION GAP SERPL CALCULATED.3IONS-SCNC: 10 MMOL/L (ref 7–15)
BUN SERPL-MCNC: 19.6 MG/DL (ref 8–23)
CALCIUM SERPL-MCNC: 9 MG/DL (ref 8.8–10.4)
CHLORIDE SERPL-SCNC: 106 MMOL/L (ref 98–107)
CREAT SERPL-MCNC: 2.33 MG/DL (ref 0.51–0.95)
CREAT UR-MCNC: 74.9 MG/DL
CREAT UR-MCNC: 74.9 MG/DL
EGFRCR SERPLBLD CKD-EPI 2021: 23 ML/MIN/1.73M2
GLUCOSE SERPL-MCNC: 128 MG/DL (ref 70–99)
HCO3 SERPL-SCNC: 27 MMOL/L (ref 22–29)
MICROALBUMIN UR-MCNC: 25.5 MG/L
MICROALBUMIN/CREAT UR: 34.05 MG/G CR (ref 0–25)
PHOSPHATE SERPL-MCNC: 4.1 MG/DL (ref 2.5–4.5)
POTASSIUM SERPL-SCNC: 3.6 MMOL/L (ref 3.4–5.3)
PROT/CREAT 24H UR: 0.4 MG/MG CR (ref 0–0.2)
SODIUM SERPL-SCNC: 143 MMOL/L (ref 135–145)

## 2025-05-13 PROCEDURE — 99214 OFFICE O/P EST MOD 30 MIN: CPT | Performed by: INTERNAL MEDICINE

## 2025-05-13 PROCEDURE — 3078F DIAST BP <80 MM HG: CPT | Performed by: INTERNAL MEDICINE

## 2025-05-13 PROCEDURE — G2211 COMPLEX E/M VISIT ADD ON: HCPCS | Performed by: INTERNAL MEDICINE

## 2025-05-13 PROCEDURE — 3074F SYST BP LT 130 MM HG: CPT | Performed by: INTERNAL MEDICINE

## 2025-05-13 RX ORDER — CALCITRIOL 0.25 UG/1
0.25 CAPSULE, LIQUID FILLED ORAL
Qty: 90 CAPSULE | Refills: 0 | Status: SHIPPED | OUTPATIENT
Start: 2025-05-14
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
## 2025-05-13 NOTE — NURSING NOTE
Evelyn Cisneros's goals for this visit include:   Chief Complaint   Patient presents with    RECHECK     Follow up CKD        She requests these members of her care team be copied on today's visit information: no     PCP: Renetta Aguilar    Referring Provider:  Referred Self, MD  No address on file    /78 (BP Location: Left arm, Patient Position: Chair, Cuff Size: Adult Regular)   Pulse 73   Wt 85.4 kg (188 lb 4.8 oz)   SpO2 94%   BMI 34.16 kg/m      Do you need any medication refills at today's visit? No     ANSON Cortes   Neph/Pulm [facility]

## 2025-05-13 NOTE — PATIENT INSTRUCTIONS
It was a pleasure taking care of you today.  I've included a brief summary of our discussion and care plan from today's visit below.  Please review this information with your primary care provider.     My recommendations are summarized as follows:  -Will update you about the rest of your kidney disease   -Will start a new medication called calcitriol 0.25 mg on Monday, Wednesday and Friday    Who do I call with any questions after my visit?  Please be in touch if there are any further questions that arise following today's visit.  There are multiple ways to contact your nephrology care team.       During business hours, you may reach your Nephrology Care Team or schedule or reschedule an appointment or lab at [phone].       If you need to schedule imaging, please call [phone].   To schedule a COVID test, please call [phone].     You can always send a secure message through Inside Social. Inside Social messages are answered by your nurse or doctor typically within 24-48 hours. Please allow extra time on weekends and holidays.       For urgent/emergent questions after business hours, you may reach the on-call Nephrology Fellow by contacting the [facility]  at [phone].     How will I get the results of any tests ordered?    You will receive all of your results.  If you have signed up for Inside Social, any tests ordered at your visit will be available to you once resulted on britebillhart. Typically the physician reviews them and may or may not make further recommendations. If there are urgent results that require a change in your care plan, your physician or nurse will call you to discuss the next steps. If you are not on britebillhart, a letter may be generated and mailed to you with your results.

## 2025-05-15 ENCOUNTER — INFUSION THERAPY VISIT (OUTPATIENT)
Dept: INFUSION THERAPY | Facility: CLINIC | Age: 60
End: 2025-05-15
Attending: INTERNAL MEDICINE
Payer: MEDICARE

## 2025-05-15 VITALS
TEMPERATURE: 98.1 F | RESPIRATION RATE: 18 BRPM | SYSTOLIC BLOOD PRESSURE: 133 MMHG | BODY MASS INDEX: 33.86 KG/M2 | HEART RATE: 66 BPM | DIASTOLIC BLOOD PRESSURE: 82 MMHG | WEIGHT: 186.6 LBS | OXYGEN SATURATION: 96 %

## 2025-05-15 DIAGNOSIS — N18.4 ANEMIA OF CHRONIC RENAL FAILURE, STAGE 4 (SEVERE) (H): Primary | ICD-10-CM

## 2025-05-15 DIAGNOSIS — D63.1 ANEMIA OF CHRONIC RENAL FAILURE, STAGE 4 (SEVERE) (H): Primary | ICD-10-CM

## 2025-05-15 LAB
HCT VFR BLD AUTO: 33.3 % (ref 35–47)
HGB BLD-MCNC: 10.8 G/DL (ref 11.7–15.7)
HOLD SPECIMEN: NORMAL
HOLD SPECIMEN: NORMAL
MCV RBC AUTO: 94 FL (ref 78–100)
MCV RBC AUTO: 94 FL (ref 78–100)

## 2025-05-15 PROCEDURE — 250N000011 HC RX IP 250 OP 636: Mod: JZ | Performed by: INTERNAL MEDICINE

## 2025-05-15 PROCEDURE — 85018 HEMOGLOBIN: CPT | Performed by: INTERNAL MEDICINE

## 2025-05-15 PROCEDURE — 85014 HEMATOCRIT: CPT | Performed by: INTERNAL MEDICINE

## 2025-05-15 PROCEDURE — 96372 THER/PROPH/DIAG INJ SC/IM: CPT | Performed by: INTERNAL MEDICINE

## 2025-05-15 RX ORDER — DIPHENHYDRAMINE HYDROCHLORIDE 50 MG/ML
25 INJECTION, SOLUTION INTRAMUSCULAR; INTRAVENOUS
Start: 2025-05-16

## 2025-05-15 RX ORDER — ALBUTEROL SULFATE 90 UG/1
1-2 INHALANT RESPIRATORY (INHALATION)
Start: 2025-05-16

## 2025-05-15 RX ORDER — METHYLPREDNISOLONE SODIUM SUCCINATE 40 MG/ML
40 INJECTION INTRAMUSCULAR; INTRAVENOUS
Status: CANCELLED
Start: 2025-05-16

## 2025-05-15 RX ORDER — EPINEPHRINE 1 MG/ML
0.3 INJECTION, SOLUTION INTRAMUSCULAR; SUBCUTANEOUS EVERY 5 MIN PRN
Status: CANCELLED | OUTPATIENT
Start: 2025-05-16

## 2025-05-15 RX ORDER — MEPERIDINE HYDROCHLORIDE 25 MG/ML
25 INJECTION INTRAMUSCULAR; INTRAVENOUS; SUBCUTANEOUS
OUTPATIENT
Start: 2025-05-16

## 2025-05-15 RX ORDER — MEPERIDINE HYDROCHLORIDE 25 MG/ML
25 INJECTION INTRAMUSCULAR; INTRAVENOUS; SUBCUTANEOUS
Status: CANCELLED | OUTPATIENT
Start: 2025-05-16

## 2025-05-15 RX ORDER — METHYLPREDNISOLONE SODIUM SUCCINATE 40 MG/ML
40 INJECTION INTRAMUSCULAR; INTRAVENOUS
Start: 2025-05-16

## 2025-05-15 RX ORDER — ALBUTEROL SULFATE 90 UG/1
1-2 INHALANT RESPIRATORY (INHALATION)
Status: CANCELLED
Start: 2025-05-16

## 2025-05-15 RX ORDER — DIPHENHYDRAMINE HYDROCHLORIDE 50 MG/ML
50 INJECTION, SOLUTION INTRAMUSCULAR; INTRAVENOUS
Start: 2025-05-16

## 2025-05-15 RX ORDER — ALBUTEROL SULFATE 0.83 MG/ML
2.5 SOLUTION RESPIRATORY (INHALATION)
OUTPATIENT
Start: 2025-05-16

## 2025-05-15 RX ORDER — EPINEPHRINE 1 MG/ML
0.3 INJECTION, SOLUTION INTRAMUSCULAR; SUBCUTANEOUS EVERY 5 MIN PRN
OUTPATIENT
Start: 2025-05-16

## 2025-05-15 RX ORDER — DIPHENHYDRAMINE HYDROCHLORIDE 50 MG/ML
50 INJECTION, SOLUTION INTRAMUSCULAR; INTRAVENOUS
Status: CANCELLED
Start: 2025-05-16

## 2025-05-15 RX ORDER — ALBUTEROL SULFATE 0.83 MG/ML
2.5 SOLUTION RESPIRATORY (INHALATION)
Status: CANCELLED | OUTPATIENT
Start: 2025-05-16

## 2025-05-15 RX ORDER — DIPHENHYDRAMINE HYDROCHLORIDE 50 MG/ML
25 INJECTION, SOLUTION INTRAMUSCULAR; INTRAVENOUS
Status: CANCELLED
Start: 2025-05-16

## 2025-05-15 RX ADMIN — DARBEPOETIN ALFA 60 MCG: 60 INJECTION, SOLUTION INTRAVENOUS; SUBCUTANEOUS at 11:17

## 2025-05-15 ASSESSMENT — PAIN SCALES - GENERAL: PAINLEVEL_OUTOF10: MILD PAIN (2)

## 2025-05-15 NOTE — PROGRESS NOTES
Infusion Nursing Note:  Evelyn Cisneros presents today for Aranesp.    Patient seen by provider today: No   present during visit today: Not Applicable.    Note: N/A.      Intravenous Access:  No Intravenous access/labs at this visit.    Treatment Conditions:  Lab Results   Component Value Date    HGB 10.8 (L) 05/15/2025    WBC 7.3 03/06/2025    ANEU 2.9 01/14/2016     03/06/2025        Results reviewed, labs MET treatment parameters, ok to proceed with treatment.      Post Infusion Assessment:  Patient tolerated injection without incident.       Discharge Plan:   Patient discharged in stable condition accompanied by: attendant.  Departure Mode: Ambulatory.      Elizabeth Noel RN

## 2025-05-19 ENCOUNTER — DOCUMENTATION ONLY (OUTPATIENT)
Dept: NEPHROLOGY | Facility: CLINIC | Age: 60
End: 2025-05-19
Payer: MEDICARE

## 2025-05-19 ENCOUNTER — MYC MEDICAL ADVICE (OUTPATIENT)
Dept: NEPHROLOGY | Facility: CLINIC | Age: 60
End: 2025-05-19
Payer: MEDICARE

## 2025-05-19 NOTE — PROGRESS NOTES
Evelyn Cisneros has an upcoming lab appointment:Please place lab order in epic     Thank you    [facility] lab team  [phone]   I am not seeing an upcoming Neph appointment but patient notes states Neph Labs did you need labs done or is it patient error in the notes ?    Future Appointments   Date Time Provider Department Center   5/27/2025  9:45 AM FK LAB [facility] [place] [facility]   5/28/2025 12:30 PM Aruna Ferrara PA-C [facility]   5/29/2025 10:45 AM MG CANCER PIV LAB [facility] [place]   5/29/2025 11:00 AM MG [address] INFUSION [facility] [place]   9/12/2025 11:00 AM FK LAB [facility] [facility]   9/15/2025  1:10 PM Barb Helms PA-C [facility] [facility]   1/9/2026 11:00 AM FK LAB [facility]   1/13/2026 12:30 PM Nathan Blood MD [facility] [place]

## 2025-05-21 ENCOUNTER — PATIENT OUTREACH (OUTPATIENT)
Dept: NEPHROLOGY | Facility: CLINIC | Age: 60
End: 2025-05-21
Payer: MEDICARE

## 2025-05-21 NOTE — PROGRESS NOTES
"Nephtracking Updated    Neph Tracking Flowsheet Last Filled Values       [facility] CKD Late Referral 01/19/24    [facility] CKD Late Complete 03/14/24  Dr. August Class Completed    Preferred Modality Peritoneal Dialysis    Patient's Referral Dates Auto Populate Patient's Referral Dates    Anemia Services Referral 1/11/24    Journey Referral 1/11/24    Access Surgeon Referral Status  --  Per Dr. Blood 5/13/25 OV: \"No indication for initiating dialysis yet. Will refer for access evaluation in the next 6 months.\"    Transplant Status  Not Referred  4/25/2024- GFR too high at this time          Patient is arranged for September and January follow up visits.   "

## 2025-05-28 ENCOUNTER — OFFICE VISIT (OUTPATIENT)
Dept: DERMATOLOGY | Facility: CLINIC | Age: 60
End: 2025-05-28
Attending: PHYSICIAN ASSISTANT
Payer: MEDICARE

## 2025-05-28 DIAGNOSIS — L30.9 DERMATITIS: ICD-10-CM

## 2025-05-28 DIAGNOSIS — I87.2 STASIS DERMATITIS OF BOTH LEGS: Primary | ICD-10-CM

## 2025-05-28 RX ORDER — TACROLIMUS 1 MG/G
OINTMENT TOPICAL 2 TIMES DAILY
Qty: 60 G | Refills: 2 | Status: SHIPPED | OUTPATIENT
Start: 2025-05-28

## 2025-05-28 RX ORDER — LUBIPROSTONE 24 UG/1
CAPSULE ORAL
COMMUNITY

## 2025-05-28 RX ORDER — DESOXIMETASONE 2.5 MG/G
CREAM TOPICAL
COMMUNITY

## 2025-05-28 RX ORDER — TIRZEPATIDE 2.5 MG/.5ML
INJECTION, SOLUTION SUBCUTANEOUS
COMMUNITY
Start: 2025-05-12 | End: 2025-06-09

## 2025-05-28 RX ORDER — FLUCONAZOLE 150 MG/1
TABLET ORAL
COMMUNITY
Start: 2025-04-10

## 2025-05-28 RX ORDER — LIDOCAINE HYDROCHLORIDE 20 MG/ML
SOLUTION OROPHARYNGEAL
COMMUNITY
Start: 2024-01-31

## 2025-05-28 RX ORDER — DESOXIMETASONE 2.5 MG/G
OINTMENT TOPICAL 2 TIMES DAILY
Qty: 60 G | Refills: 0 | Status: SHIPPED | OUTPATIENT
Start: 2025-05-28

## 2025-05-28 RX ORDER — CEPHALEXIN 500 MG/1
1 CAPSULE ORAL
COMMUNITY
Start: 2025-01-31

## 2025-05-28 RX ORDER — POLYETHYLENE GLYCOL-3350 AND ELECTROLYTES 236; 6.74; 5.86; 2.97; 22.74 G/274.31G; G/274.31G; G/274.31G; G/274.31G; G/274.31G
POWDER, FOR SOLUTION ORAL
COMMUNITY

## 2025-05-28 RX ORDER — DEXAMETHASONE 0.5 MG/5ML
ELIXIR ORAL
COMMUNITY
Start: 2025-03-31

## 2025-05-28 ASSESSMENT — PAIN SCALES - GENERAL: PAINLEVEL_OUTOF10: NO PAIN (0)

## 2025-05-28 NOTE — PROGRESS NOTES
[facility] Dermatology Note  Encounter Date: May 28, 2025  Office Visit     Dermatology Problem List:  1. Stage IV KCD  2. Stasis dermatitis, ddx contact/irritant   - current tx: desoximetasone 0.25% ointment, tacrolimus 0.1% ointment  - prior tx: triamcinolone 0.1% ointment (not helpful), desoximetasone 0.25% cream (pt has propylene glycol sensitivity)   ____________________________________________    Assessment & Plan:    # Stasis dermatitis, with possible allergic contact/irritant dermatitis,  bilateral lower extremities with widespread activity and urticaria beginning 01/2025. No new medications or dosing changes or life/health stressors at that time. Patient stopped wearing compression stockings to see if material was cause. Patient does have multiple allergies listed in chart.   Discussed patch testing and switching medication carrier component from cream to ointment to avoid propylene glycol. Deferring biopsy at this time due to patient's long standing diabetes history.   - Photodocumentation today.   - Start desoximetasone 0.25% ointment BID until clear, typically 2 weeks.    - After rashes have resolved, start tacrolimus 0.1% ointment BID for one week. Then can use for intermittently for new areas.   - Referral for patch testing placed today.       Procedures Performed:   none    Follow-up: 3 month(s) in-person, or earlier for new or changing lesions    Staff and Scribe:     Scribe Disclosure:   I, Clarisa Mata, am serving as a scribe to document services personally performed by Aruna Ferrara PA-C based on data collection and the provider's statements to me.     Provider Disclosure:   The documentation recorded by the scribe accurately reflects the services I personally performed and the decisions made by me.    All risks, benefits and alternatives were discussed with patient.  Patient is in agreement and understands the assessment and plan.  All questions were answered.  Sun  Screen Education was given.   Return to Clinic in 3 months or sooner as needed.   Aruna Ferrara PA-C   [facility]    ____________________________________________    CC: Derm Problem (Itchy rash on lower legs,  feels rough per pt. Treating with triamcinolone, does not help, started using desoximetasone cream, this seemed to help but can only use for 2 weeks and did not resolve. )    HPI:  Ms. Evelyn Cisneros is a(n) 59 year old female who presents today as a new patient for derm problem. Referred by Barb Helms PA-C for Dermatitis      Today, patient reports itchy rash on lower legs that feels rough. Areas also feel like they are on fire. This has been ongoing since January. Sometimes extends onto feet. Used desoximetasone for 2 weeks that did help but did not resolve, then stopped using as was instructed to use only 2 weeks. Has been using triamcinolone but not helpful. Stopped wearing compression stockings as she thought this may have been the cause but that did not seem to help. Reports no new medication changes or dosing changes when this started happening.       Patient is otherwise feeling well, without additional skin concerns.    Labs Reviewed:  N/A    Physical Exam:  Vitals: There were no vitals taken for this visit.  SKIN: Focused examination of bilateral legs was performed.  - Few pink papules on the R lateral lower leg.   - On the L lower leg, there are pink papules coalescing into plaques, mainly on the lateral part of the leg.   Hyperpigmented patches scatted on the lower legs.   - No other lesions of concern on areas examined.     Medications:  Current Outpatient Medications   Medication Sig Dispense Refill    Acetaminophen (TYLENOL PO) Take 1,000 mg by mouth every 8 hours as needed for mild pain or fever      albuterol (2.5 MG/3ML) 0.083% nebulizer solution Take 1 vial (2.5 mg) by nebulization every 6 hours as needed for shortness of breath / dyspnea or wheezing 1  Box 1    artificial tears OINT ophthalmic ointment Place into both eyes at bedtime.      aspirin (ASA) 81 MG chewable tablet Take 81 mg by mouth daily      calcitRIOL (ROCALTROL) 0.25 MCG capsule Take 1 capsule (0.25 mcg) by mouth Every Mon, Wed, Fri Morning. 90 capsule 0    carvedilol (COREG) 12.5 MG tablet Take 12.5 mg by mouth 2 times daily (with meals)      cephALEXin (KEFLEX) 500 MG capsule 1 capsule.      Cetirizine HCl (ZYRTEC ALLERGY PO) Take 10 mg by mouth every evening       conjugated estrogens (PREMARIN) 0.625 MG/GM vaginal cream Place 1.5 g vaginally twice a week 3 g 3    Cyclobenzaprine HCl (FLEXERIL PO) Take 10 mg by mouth 3 times daily as needed for muscle spasms      desoximetasone (TOPICORT) 0.25 % external cream External for 30 Days      dexAMETHasone (DECADRON) 0.5 MG/5ML elixir       fluconazole (DIFLUCAN) 150 MG tablet       hydrOXYzine HCl (ATARAX) 25 MG tablet 1 tablet as needed Orally three times daily      isosorbide mononitrate (IMDUR) 30 MG 24 hr tablet Take 30 mg by mouth daily      lidocaine, viscous, (XYLOCAINE) 2 % solution       LORAZEPAM PO Take 1 mg by mouth 2 times daily as needed for anxiety      lubiprostone (AMITIZA) 24 MCG capsule Not started yet      Montelukast Sodium (SINGULAIR PO) Take 10 mg by mouth At Bedtime      NARCAN 4 MG/0.1ML nasal spray Spray 1 spray in nostril      nystatin (MYCOSTATIN) 772727 UNIT/ML suspension  ML OF NYSTATIN MIXED WITH LIDOCAINE FOR A TOTAL  ML, SWISH AND SPIT 5 ML UP TO 6 TIMES DAILY      olopatadine (PATADAY) 0.2 % ophthalmic solution Place 1 drop into both eyes 2 times daily as needed      Ondansetron (ZUPLENZ PO)       PAXIL CR 25 MG 24 hr tablet Take 25 mg by mouth every morning.      polyethylene glycol (GAVILYTE-G) 236 g suspension       rosuvastatin (CRESTOR) 10 MG tablet Take 1 tablet by mouth daily      senna-docusate (SENOKOT-S/PERICOLACE) 8.6-50 MG tablet Take 2 tablets by mouth daily      tirzepatide (MOUNJARO) 2.5  MG/0.5ML SOAJ auto-injector pen Not started yet      TRADJENTA 5 MG TABS tablet Take 5 mg by mouth      triamcinolone (KENALOG) 0.1 % external ointment APPLY A TIC TAC SIZE AMOUNT TO RIGHT EAR CANAL ONCE A DAY      UNABLE TO FIND 1 Units by IMPLANT route continuous MEDICATION NAME: hydromorphone, fentanyl, buvpivocaine      VENTOLIN  (90 Base) MCG/ACT inhaler USE 1 PUFF BY MOUTH AS NEEDED EVERY 4 HOURS 30      White Petrolatum-Mineral Oil (SYSTANE NIGHTTIME) OINT APPLY A SMALL AMOUNT INTO BOTH EYES AT BEDTIME      PARoxetine HCl (PAXIL PO) Take 30 mg by mouth every evening Take  50 mg daily       No current facility-administered medications for this visit.      Past Medical History:   Patient Active Problem List   Diagnosis    Anemia of chronic renal failure, stage 4 (severe) (H)    CKD stage 4 secondary to hypertension (H)    Diabetes mellitus due to underlying condition with stage 3 chronic kidney disease, unspecified whether long term insulin use, unspecified whether stage 3a or 3b CKD (H)    Hypertension, essential    Severe episode of recurrent major depressive disorder (H)    Spinal stenosis of lumbar region at multiple levels    Social phobia    Severe obesity (H)    Recurrent urinary tract infection    Presence of implanted infusion pump    Torn meniscus    Polyneuropathy due to type 2 diabetes mellitus (H)    Panic disorder with agoraphobia, severe agoraphobic avoidance and mild panic attacks    Overactive bladder    Osteoporosis with current pathological fracture    Osteoarthritis of hip    Obstructive sleep apnea syndrome    Migraine headache    Insomnia    Hyperlipidemia    Goiter    Intrinsic asthma without status asthmaticus    Drug-induced constipation    Disuse osteoporosis with pathological fracture    Closed fracture of lumbar vertebra with spinal cord injury (H)    Compression fracture of body of thoracic vertebra (H)    Compression fracture of L2 lumbar vertebra, closed, initial encounter  (H)    Chronic pain disorder    Agoraphobia with panic disorder    Degeneration of intervertebral disc of lumbosacral region     Past Medical History:   Diagnosis Date    Diabetes (H)     History of blood transfusion     Hypertension     Thyroid disease     Uncomplicated asthma         CC Barb Helms PA-C  [address] [place] [phone] on close of this encounter.

## 2025-05-28 NOTE — NURSING NOTE
Dermatology Rooming Note    Evelyn Cisneros's goals for this visit include:   Chief Complaint   Patient presents with    Derm Problem     Itchy rash on lower legs,  feels rough per pt. Treating with triamcinolone, does not help, started using desoximetasone cream, this seemed to help but can only use for 2 weeks and did not resolve.      Ashleigh Kim CA

## 2025-05-28 NOTE — PATIENT INSTRUCTIONS
For the next 2-3 weeks apply Desoximethasone ointment twice daily until resolved. Then transition to tacrolimus 0.1% ointment twice daily x 1 week to prevent rashes from returning.

## 2025-05-28 NOTE — LETTER
5/28/2025       RE: Evelyn Cisneros  [address] [id number] [phone]  [place] [address]     Dear Colleague,    Thank you for referring your patient, Evelyn Cisneros, to the [facility] [place] [facility]. Please see a copy of my visit note below.      [facility] Dermatology Note  Encounter Date: May 28, 2025  Office Visit     Dermatology Problem List:  1. Stage IV KCD  2. Stasis dermatitis, ddx contact/irritant   - current tx: desoximetasone 0.25% ointment, tacrolimus 0.1% ointment  - prior tx: triamcinolone 0.1% ointment (not helpful), desoximetasone 0.25% cream (pt has propylene glycol sensitivity)   ____________________________________________    Assessment & Plan:    # Stasis dermatitis, with possible allergic contact/irritant dermatitis,  bilateral lower extremities with widespread activity and urticaria beginning 01/2025. No new medications or dosing changes or life/health stressors at that time. Patient stopped wearing compression stockings to see if material was cause. Patient does have multiple allergies listed in chart.   Discussed patch testing and switching medication carrier component from cream to ointment to avoid propylene glycol. Deferring biopsy at this time due to patient's long standing diabetes history.   - Photodocumentation today.   - Start desoximetasone 0.25% ointment BID until clear, typically 2 weeks.    - After rashes have resolved, start tacrolimus 0.1% ointment BID for one week. Then can use for intermittently for new areas.   - Referral for patch testing placed today.       Procedures Performed:   none    Follow-up: 3 month(s) in-person, or earlier for new or changing lesions    Staff and Scribe:     Scribe Disclosure:   Clarisa JARAMILLO, am serving as a scribe to document services personally performed by Aruna Ferrara PA-C based on data collection and the provider's statements to me.      Provider Disclosure:   The documentation recorded by the scribe accurately reflects the services I personally performed and the decisions made by me.    All risks, benefits and alternatives were discussed with patient.  Patient is in agreement and understands the assessment and plan.  All questions were answered.  Sun Screen Education was given.   Return to Clinic in 3 months or sooner as needed.   Aruna Ferrara PA-C   [facility]    ____________________________________________    CC: Derm Problem (Itchy rash on lower legs,  feels rough per pt. Treating with triamcinolone, does not help, started using desoximetasone cream, this seemed to help but can only use for 2 weeks and did not resolve. )    HPI:  Ms. Evelyn Cisneros is a(n) 59 year old female who presents today as a new patient for derm problem. Referred by Barb Helms PA-C for Dermatitis      Today, patient reports itchy rash on lower legs that feels rough. Areas also feel like they are on fire. This has been ongoing since January. Sometimes extends onto feet. Used desoximetasone for 2 weeks that did help but did not resolve, then stopped using as was instructed to use only 2 weeks. Has been using triamcinolone but not helpful. Stopped wearing compression stockings as she thought this may have been the cause but that did not seem to help. Reports no new medication changes or dosing changes when this started happening.       Patient is otherwise feeling well, without additional skin concerns.    Labs Reviewed:  N/A    Physical Exam:  Vitals: There were no vitals taken for this visit.  SKIN: Focused examination of bilateral legs was performed.  - Few pink papules on the R lateral lower leg.   - On the L lower leg, there are pink papules coalescing into plaques, mainly on the lateral part of the leg.   Hyperpigmented patches scatted on the lower legs.   - No other lesions of concern on areas examined.      Medications:  Current Outpatient Medications   Medication Sig Dispense Refill     Acetaminophen (TYLENOL PO) Take 1,000 mg by mouth every 8 hours as needed for mild pain or fever       albuterol (2.5 MG/3ML) 0.083% nebulizer solution Take 1 vial (2.5 mg) by nebulization every 6 hours as needed for shortness of breath / dyspnea or wheezing 1 Box 1     artificial tears OINT ophthalmic ointment Place into both eyes at bedtime.       aspirin (ASA) 81 MG chewable tablet Take 81 mg by mouth daily       calcitRIOL (ROCALTROL) 0.25 MCG capsule Take 1 capsule (0.25 mcg) by mouth Every Mon, Wed, Fri Morning. 90 capsule 0     carvedilol (COREG) 12.5 MG tablet Take 12.5 mg by mouth 2 times daily (with meals)       cephALEXin (KEFLEX) 500 MG capsule 1 capsule.       Cetirizine HCl (ZYRTEC ALLERGY PO) Take 10 mg by mouth every evening        conjugated estrogens (PREMARIN) 0.625 MG/GM vaginal cream Place 1.5 g vaginally twice a week 3 g 3     Cyclobenzaprine HCl (FLEXERIL PO) Take 10 mg by mouth 3 times daily as needed for muscle spasms       desoximetasone (TOPICORT) 0.25 % external cream External for 30 Days       dexAMETHasone (DECADRON) 0.5 MG/5ML elixir        fluconazole (DIFLUCAN) 150 MG tablet        hydrOXYzine HCl (ATARAX) 25 MG tablet 1 tablet as needed Orally three times daily       isosorbide mononitrate (IMDUR) 30 MG 24 hr tablet Take 30 mg by mouth daily       lidocaine, viscous, (XYLOCAINE) 2 % solution        LORAZEPAM PO Take 1 mg by mouth 2 times daily as needed for anxiety       lubiprostone (AMITIZA) 24 MCG capsule Not started yet       Montelukast Sodium (SINGULAIR PO) Take 10 mg by mouth At Bedtime       NARCAN 4 MG/0.1ML nasal spray Spray 1 spray in nostril       nystatin (MYCOSTATIN) [id number] UNIT/ML suspension  ML OF NYSTATIN MIXED WITH LIDOCAINE FOR A TOTAL  ML, SWISH AND SPIT 5 ML UP TO 6 TIMES DAILY       olopatadine (PATADAY) 0.2 % ophthalmic solution Place 1 drop into both eyes 2  times daily as needed       Ondansetron (ZUPLENZ PO)        PAXIL CR 25 MG 24 hr tablet Take 25 mg by mouth every morning.       polyethylene glycol (GAVILYTE-G) 236 g suspension        rosuvastatin (CRESTOR) 10 MG tablet Take 1 tablet by mouth daily       senna-docusate (SENOKOT-S/PERICOLACE) 8.6-50 MG tablet Take 2 tablets by mouth daily       tirzepatide (MOUNJARO) 2.5 MG/0.5ML SOAJ auto-injector pen Not started yet       TRADJENTA 5 MG TABS tablet Take 5 mg by mouth       triamcinolone (KENALOG) 0.1 % external ointment APPLY A TIC TAC SIZE AMOUNT TO RIGHT EAR CANAL ONCE A DAY       UNABLE TO FIND 1 Units by IMPLANT route continuous MEDICATION NAME: hydromorphone, fentanyl, buvpivocaine       VENTOLIN  (90 Base) MCG/ACT inhaler USE 1 PUFF BY MOUTH AS NEEDED EVERY 4 HOURS 30       White Petrolatum-Mineral Oil (SYSTANE NIGHTTIME) OINT APPLY A SMALL AMOUNT INTO BOTH EYES AT BEDTIME       PARoxetine HCl (PAXIL PO) Take 30 mg by mouth every evening Take  50 mg daily       No current facility-administered medications for this visit.      Past Medical History:   Patient Active Problem List   Diagnosis     Anemia of chronic renal failure, stage 4 (severe) (H)     CKD stage 4 secondary to hypertension (H)     Diabetes mellitus due to underlying condition with stage 3 chronic kidney disease, unspecified whether long term insulin use, unspecified whether stage 3a or 3b CKD (H)     Hypertension, essential     Severe episode of recurrent major depressive disorder (H)     Spinal stenosis of lumbar region at multiple levels     Social phobia     Severe obesity (H)     Recurrent urinary tract infection     Presence of implanted infusion pump     Torn meniscus     Polyneuropathy due to type 2 diabetes mellitus (H)     Panic disorder with agoraphobia, severe agoraphobic avoidance and mild panic attacks     Overactive bladder     Osteoporosis with current pathological fracture     Osteoarthritis of hip     Obstructive sleep  apnea syndrome     Migraine headache     Insomnia     Hyperlipidemia     Goiter     Intrinsic asthma without status asthmaticus     Drug-induced constipation     Disuse osteoporosis with pathological fracture     Closed fracture of lumbar vertebra with spinal cord injury (H)     Compression fracture of body of thoracic vertebra (H)     Compression fracture of L2 lumbar vertebra, closed, initial encounter (H)     Chronic pain disorder     Agoraphobia with panic disorder     Degeneration of intervertebral disc of lumbosacral region     Past Medical History:   Diagnosis Date     Diabetes (H)      History of blood transfusion      Hypertension      Thyroid disease      Uncomplicated asthma         CC Barb Helms PA-C  [address] [place] [phone] on close of this encounter.      Again, thank you for allowing me to participate in the care of your patient.      Sincerely,    Aruna Ferrara PA-C

## 2025-05-29 ENCOUNTER — LAB (OUTPATIENT)
Dept: INFUSION THERAPY | Facility: CLINIC | Age: 60
End: 2025-05-29
Attending: INTERNAL MEDICINE
Payer: MEDICARE

## 2025-05-29 ENCOUNTER — PATIENT OUTREACH (OUTPATIENT)
Dept: CARE COORDINATION | Facility: CLINIC | Age: 60
End: 2025-05-29
Payer: MEDICARE

## 2025-05-29 ENCOUNTER — PATIENT OUTREACH (OUTPATIENT)
Dept: CARE COORDINATION | Facility: CLINIC | Age: 60
End: 2025-05-29

## 2025-05-29 DIAGNOSIS — N18.4 ANEMIA OF CHRONIC RENAL FAILURE, STAGE 4 (SEVERE) (H): Primary | ICD-10-CM

## 2025-05-29 DIAGNOSIS — D63.1 ANEMIA OF CHRONIC RENAL FAILURE, STAGE 4 (SEVERE) (H): Primary | ICD-10-CM

## 2025-05-29 DIAGNOSIS — N18.4 CKD (CHRONIC KIDNEY DISEASE) STAGE 4, GFR 15-29 ML/MIN (H): ICD-10-CM

## 2025-05-29 LAB
HCT VFR BLD AUTO: 36.4 % (ref 35–47)
HGB BLD-MCNC: 11.6 G/DL (ref 11.7–15.7)
HOLD SPECIMEN: NORMAL
HOLD SPECIMEN: NORMAL
MCV RBC AUTO: 95 FL (ref 78–100)

## 2025-05-29 PROCEDURE — 85018 HEMOGLOBIN: CPT

## 2025-05-29 RX ORDER — MEPERIDINE HYDROCHLORIDE 25 MG/ML
25 INJECTION INTRAMUSCULAR; INTRAVENOUS; SUBCUTANEOUS
OUTPATIENT
Start: 2025-05-30

## 2025-05-29 RX ORDER — EPINEPHRINE 1 MG/ML
0.3 INJECTION, SOLUTION INTRAMUSCULAR; SUBCUTANEOUS EVERY 5 MIN PRN
OUTPATIENT
Start: 2025-05-30

## 2025-05-29 RX ORDER — DIPHENHYDRAMINE HYDROCHLORIDE 50 MG/ML
25 INJECTION, SOLUTION INTRAMUSCULAR; INTRAVENOUS
Start: 2025-05-30

## 2025-05-29 RX ORDER — METHYLPREDNISOLONE SODIUM SUCCINATE 40 MG/ML
40 INJECTION INTRAMUSCULAR; INTRAVENOUS
Start: 2025-05-30

## 2025-05-29 RX ORDER — ALBUTEROL SULFATE 0.83 MG/ML
2.5 SOLUTION RESPIRATORY (INHALATION)
OUTPATIENT
Start: 2025-05-30

## 2025-05-29 RX ORDER — DIPHENHYDRAMINE HYDROCHLORIDE 50 MG/ML
50 INJECTION, SOLUTION INTRAMUSCULAR; INTRAVENOUS
Start: 2025-05-30

## 2025-05-29 RX ORDER — ALBUTEROL SULFATE 90 UG/1
1-2 INHALANT RESPIRATORY (INHALATION)
Start: 2025-05-30

## 2025-05-29 NOTE — PROGRESS NOTES
Infusion Nursing Note:  Evelyn MCGOVERN Amelia presents today for Aranesp- NOT GIVEN.    Patient seen by provider today: No   present during visit today: Not Applicable.    Note: N/A.      Intravenous Access:  No Intravenous access/labs at this visit.    Treatment Conditions:  Lab Results   Component Value Date    HGB 11.6 (L) 05/29/2025    WBC 7.3 03/06/2025    ANEU 2.9 01/14/2016     03/06/2025        Results reviewed, labs did NOT meet treatment parameters: Hgb > 11.0      Post Infusion Assessment:  Not applicable.       Discharge Plan:   Patient discharged in stable condition accompanied by: self.  Departure Mode: Ambulatory.      Melina Cunningham LPN

## 2025-05-29 NOTE — PROGRESS NOTES
Anemia Management Note - Follow Up      SUBJECTIVE/OBJECTIVE:    Referred by Dr. Nathan Blood on 2024  Primary Diagnosis: Anemia in Chronic Kidney Disease (N18.4, D63.1)     Secondary Diagnosis:  Chronic Kidney Disease, Stage 4 (N18.4)   Hgb goal range:  10-11 *range increased on 24 per Dr. Blood     Epo/Darbo: Aranesp 60mcg every 14 days for Hgb <11.0. In Clinic.   Iron regimen:  Treat with IV Iron as needed.     *ok to stop oral Iron.  No improvement in Iron levels with Oral Iron.   *completed Injectafer on 24.      Labs : 2025  RX/TX plans : 7/10/2025     *staying in [place].      No history of stroke, MI, and blood clots or cancers.  Recent BRANDON use, transfusion, IV iron: NA     Contact:            No Consent to Communicate on File.        Latest Ref Rng & Units 4/3/2025 2025 2025 2025 2025 5/15/2025 2025   Anemia   HGB Goal   10 - 11    10 - 11    BRANDON Dose   60 mcg    60 mcg    Hemoglobin 11.7 - 15.7 g/dL 11.7  10.4  11.1  11.0  11.1  10.8  11.6    TSAT 15 - 46 % 38  28  36  36       Ferritin 11 - 328 ng/mL 351  448  352  336         BP Readings from Last 3 Encounters:   05/15/25 133/82   25 123/78   25 105/65     Wt Readings from Last 2 Encounters:   05/15/25 84.6 kg (186 lb 9.6 oz)   25 85.4 kg (188 lb 4.8 oz)           ASSESSMENT:    Hgb:Above goal - recommend hold dose  TSat: at goal >30% Ferritin: At goal (>100ng/mL)   Goals Addressed    None         PLAN:  Hold Aranesp .  RTC for hgb then Aranesp if needed in 2-4 week(s). Aranesp is scheduled for  & .     Orders needed to be renewed (for next follow-up date) in EPIC: None    Iron labs due:  Aug 2025    Plan discussed with:  No call, chart review       NEXT FOLLOW-UP DATE:  25    Archana Salamanca RN   [facility] Services  [facility]  [email or web address]   Office : [phone]  Fax: [phone]

## 2025-06-02 ENCOUNTER — PATIENT OUTREACH (OUTPATIENT)
Dept: CARE COORDINATION | Facility: CLINIC | Age: 60
End: 2025-06-02
Payer: MEDICARE

## 2025-06-06 ENCOUNTER — TELEPHONE (OUTPATIENT)
Dept: DERMATOLOGY | Facility: CLINIC | Age: 60
End: 2025-06-06
Payer: MEDICARE

## 2025-06-06 NOTE — TELEPHONE ENCOUNTER
Prior Authorization Retail Medication Request    Medication/Dose: tacrolimus (PROTOPIC) 0.1 % external ointment  Diagnosis and ICD code (if different than what is on RX):  Associated Diagnoses    Stasis dermatitis of both legs [I87.2]  - Primary      Dermatitis [L30.9]        Previously Tried and Failed:  See provider's notes      Insurance   Primary: Medicare  Insurance ID:  [id number]    Secondary (if applicable):[facility]  Insurance ID:  [id number]    Pharmacy Information (if different than what is on RX)  Name:  [facility] [place] - [address]   Phone:  [phone]  Fax:[phone]

## 2025-06-09 NOTE — TELEPHONE ENCOUNTER
PA Initiation    Medication:    Insurance Company:    Pharmacy Filling the Rx: [facility] [place], [place] - [address]  Filling Pharmacy Phone: [phone]  Filling Pharmacy Fax: [phone]  Start Date: 6/9/2025

## 2025-06-11 ENCOUNTER — TELEPHONE (OUTPATIENT)
Dept: DERMATOLOGY | Facility: CLINIC | Age: 60
End: 2025-06-11
Payer: MEDICARE

## 2025-06-11 NOTE — TELEPHONE ENCOUNTER
PA Initiation    Medication: TACROLIMUS 0.1 % EX OINT  Insurance Company: [employer] ([facility]) - Phone [phone] Fax [phone]  Pharmacy Filling the Rx: [facility] [place] - [address]  Filling Pharmacy Phone: [phone]  Filling Pharmacy Fax: [phone]  Start Date: 6/11/2025

## 2025-06-11 NOTE — TELEPHONE ENCOUNTER
PRIOR AUTHORIZATION DENIED    Medication: TACROLIMUS 0.1 % EX OINT  Insurance Company: [employer] - Phone [phone] Fax [phone]  Denial Date: 6/10/2025   Denial with [employer] medicaid Due to patient has primary coverage with a another plan please see 6/11/2025 encounter for PA start with pts primary insurance coverage

## 2025-06-12 ENCOUNTER — LAB (OUTPATIENT)
Dept: INFUSION THERAPY | Facility: CLINIC | Age: 60
End: 2025-06-12
Attending: INTERNAL MEDICINE
Payer: MEDICARE

## 2025-06-12 DIAGNOSIS — D63.1 ANEMIA OF CHRONIC RENAL FAILURE, STAGE 4 (SEVERE) (H): ICD-10-CM

## 2025-06-12 DIAGNOSIS — N18.4 ANEMIA OF CHRONIC RENAL FAILURE, STAGE 4 (SEVERE) (H): ICD-10-CM

## 2025-06-12 DIAGNOSIS — D63.1 ANEMIA OF CHRONIC RENAL FAILURE, STAGE 4 (SEVERE) (H): Primary | ICD-10-CM

## 2025-06-12 DIAGNOSIS — N18.4 ANEMIA OF CHRONIC RENAL FAILURE, STAGE 4 (SEVERE) (H): Primary | ICD-10-CM

## 2025-06-12 LAB
HCT VFR BLD AUTO: 34.2 % (ref 35–47)
HGB BLD-MCNC: 11.1 G/DL (ref 11.7–15.7)
HOLD SPECIMEN: NORMAL
HOLD SPECIMEN: NORMAL
MCV RBC AUTO: 93 FL (ref 78–100)
MCV RBC AUTO: 93 FL (ref 78–100)

## 2025-06-12 PROCEDURE — 85014 HEMATOCRIT: CPT | Performed by: INTERNAL MEDICINE

## 2025-06-12 PROCEDURE — 85018 HEMOGLOBIN: CPT | Performed by: INTERNAL MEDICINE

## 2025-06-12 RX ORDER — DIPHENHYDRAMINE HYDROCHLORIDE 50 MG/ML
50 INJECTION, SOLUTION INTRAMUSCULAR; INTRAVENOUS
Start: 2025-06-26

## 2025-06-12 RX ORDER — MEPERIDINE HYDROCHLORIDE 25 MG/ML
25 INJECTION INTRAMUSCULAR; INTRAVENOUS; SUBCUTANEOUS
OUTPATIENT
Start: 2025-06-26

## 2025-06-12 RX ORDER — ALBUTEROL SULFATE 90 UG/1
1-2 INHALANT RESPIRATORY (INHALATION)
Start: 2025-06-26

## 2025-06-12 RX ORDER — ALBUTEROL SULFATE 0.83 MG/ML
2.5 SOLUTION RESPIRATORY (INHALATION)
OUTPATIENT
Start: 2025-06-26

## 2025-06-12 RX ORDER — DIPHENHYDRAMINE HYDROCHLORIDE 50 MG/ML
25 INJECTION, SOLUTION INTRAMUSCULAR; INTRAVENOUS
Start: 2025-06-26

## 2025-06-12 RX ORDER — METHYLPREDNISOLONE SODIUM SUCCINATE 40 MG/ML
40 INJECTION INTRAMUSCULAR; INTRAVENOUS
Start: 2025-06-26

## 2025-06-12 RX ORDER — EPINEPHRINE 1 MG/ML
0.3 INJECTION, SOLUTION INTRAMUSCULAR; SUBCUTANEOUS EVERY 5 MIN PRN
OUTPATIENT
Start: 2025-06-26

## 2025-06-12 NOTE — PROGRESS NOTES
Infusion Nursing Note:  Evelyn SHANIQUA Cisneros presents today for Aranesp.    Patient seen by provider today: No   present during visit today: Not Applicable.    Note: N/A.      Intravenous Access:  No Intravenous access/labs at this visit.    Treatment Conditions:  Lab Results   Component Value Date    HGB 11.1 (L) 06/12/2025    WBC 7.3 03/06/2025    ANEU 2.9 01/14/2016     03/06/2025        Results reviewed, labs did NOT meet treatment parameters: Injection not given,parameters less than 11.      Post Infusion Assessment:  N/A.       Discharge Plan:   N/A.      Samantha Tobar MA

## 2025-06-17 ENCOUNTER — TELEPHONE (OUTPATIENT)
Dept: DERMATOLOGY | Facility: CLINIC | Age: 60
End: 2025-06-17
Payer: MEDICARE

## 2025-06-17 DIAGNOSIS — L30.9 DERMATITIS: ICD-10-CM

## 2025-06-17 DIAGNOSIS — I87.2 STASIS DERMATITIS OF BOTH LEGS: Primary | ICD-10-CM

## 2025-06-17 NOTE — TELEPHONE ENCOUNTER
[facility] Call Center    Phone Message    May a detailed message be left on voicemail: yes     Reason for Call: Medication Question or concern regarding medication   Prescription Clarification  Name of Medication: desoximetasone (TOPICORT) 0.25 % OINT ointment  Prescribing Provider: Aruna Ferrara PA-C     Pharmacy: [facility], [place] - [address] [facility]   What on the order needs clarification? Pts pharmacy is calling to get a prior auth started for pts med, please call back at [phone] and use case number [id number] thanks!      Action Taken: Message routed to:  [facility] DERM    Travel Screening: Not Applicable     Date of Service:

## 2025-06-17 NOTE — TELEPHONE ENCOUNTER
Prior Authorization Retail Medication Request    Medication/Dose: desoximetasone (TOPICORT) 0.25 % OINT  Diagnosis and ICD code (if different than what is on RX):    Stasis dermatitis of both legs [I87.2]          Insurance   Primary: Medicare  Insurance ID:  [id number]    Pharmacy Information (if different than what is on RX)  Name:  [facility]  Phone:  [phone]  Fax:[phone]

## 2025-06-18 NOTE — TELEPHONE ENCOUNTER
Retail Pharmacy Prior Authorization Team   Phone: [phone]    PA Initiation    Medication: DESOXIMETASONE 0.25 % EX CREA  Insurance Company: [email or web address] ([facility]) - Phone [phone] Fax [phone]  Pharmacy Filling the Rx: [place], [place] - [address] [facility]  Filling Pharmacy Phone: [phone]  Filling Pharmacy Fax:    Start Date: 6/18/2025    [id number]

## 2025-06-20 NOTE — TELEPHONE ENCOUNTER
PRIOR AUTHORIZATION DENIED    Medication: DESOXIMETASONE 0.25 % EX CREA  Insurance Company: [employer] ([facility]) - Phone [phone] Fax [phone]  Denial Date: 6/20/2025  Denial Reason(s):           Appeal Information:         Patient Notified: No

## 2025-06-21 ENCOUNTER — HEALTH MAINTENANCE LETTER (OUTPATIENT)
Age: 60
End: 2025-06-21

## 2025-06-24 RX ORDER — MOMETASONE FUROATE 1 MG/G
OINTMENT TOPICAL DAILY
Qty: 45 G | Refills: 1 | Status: SHIPPED | OUTPATIENT
Start: 2025-06-24

## 2025-06-24 NOTE — TELEPHONE ENCOUNTER
I Sent mometasone instead, on the approved list. (Instead of dexomethasone)  Thanks,  Aruna Ferrara PA-C

## 2025-06-26 ENCOUNTER — INFUSION THERAPY VISIT (OUTPATIENT)
Dept: INFUSION THERAPY | Facility: CLINIC | Age: 60
End: 2025-06-26
Attending: INTERNAL MEDICINE
Payer: MEDICARE

## 2025-06-26 ENCOUNTER — PATIENT OUTREACH (OUTPATIENT)
Dept: CARE COORDINATION | Facility: CLINIC | Age: 60
End: 2025-06-26

## 2025-06-26 DIAGNOSIS — N18.4 ANEMIA OF CHRONIC RENAL FAILURE, STAGE 4 (SEVERE) (H): Primary | ICD-10-CM

## 2025-06-26 DIAGNOSIS — D63.1 ANEMIA OF CHRONIC RENAL FAILURE, STAGE 4 (SEVERE) (H): ICD-10-CM

## 2025-06-26 DIAGNOSIS — D63.1 ANEMIA OF CHRONIC RENAL FAILURE, STAGE 4 (SEVERE) (H): Primary | ICD-10-CM

## 2025-06-26 DIAGNOSIS — N18.4 CKD (CHRONIC KIDNEY DISEASE) STAGE 4, GFR 15-29 ML/MIN (H): ICD-10-CM

## 2025-06-26 DIAGNOSIS — N18.4 ANEMIA OF CHRONIC RENAL FAILURE, STAGE 4 (SEVERE) (H): ICD-10-CM

## 2025-06-26 LAB
FERRITIN SERPL-MCNC: 329 NG/ML (ref 11–328)
HCT VFR BLD AUTO: 35.8 % (ref 35–47)
HGB BLD-MCNC: 11.8 G/DL (ref 11.7–15.7)
HOLD SPECIMEN: NORMAL
HOLD SPECIMEN: NORMAL
IRON BINDING CAPACITY (ROCHE): 207 UG/DL (ref 240–430)
IRON SATN MFR SERPL: 39 % (ref 15–46)
IRON SERPL-MCNC: 81 UG/DL (ref 37–145)
MCV RBC AUTO: 91 FL (ref 78–100)

## 2025-06-26 PROCEDURE — 82728 ASSAY OF FERRITIN: CPT

## 2025-06-26 PROCEDURE — 85018 HEMOGLOBIN: CPT

## 2025-06-26 PROCEDURE — 83550 IRON BINDING TEST: CPT

## 2025-06-26 RX ORDER — MEPERIDINE HYDROCHLORIDE 25 MG/ML
25 INJECTION INTRAMUSCULAR; INTRAVENOUS; SUBCUTANEOUS
OUTPATIENT
Start: 2025-07-10

## 2025-06-26 RX ORDER — DIPHENHYDRAMINE HYDROCHLORIDE 50 MG/ML
25 INJECTION, SOLUTION INTRAMUSCULAR; INTRAVENOUS
Start: 2025-07-10

## 2025-06-26 RX ORDER — ALBUTEROL SULFATE 90 UG/1
1-2 INHALANT RESPIRATORY (INHALATION)
Start: 2025-07-10

## 2025-06-26 RX ORDER — ALBUTEROL SULFATE 0.83 MG/ML
2.5 SOLUTION RESPIRATORY (INHALATION)
OUTPATIENT
Start: 2025-07-10

## 2025-06-26 RX ORDER — EPINEPHRINE 1 MG/ML
0.3 INJECTION, SOLUTION INTRAMUSCULAR; SUBCUTANEOUS EVERY 5 MIN PRN
OUTPATIENT
Start: 2025-07-10

## 2025-06-26 RX ORDER — DIPHENHYDRAMINE HYDROCHLORIDE 50 MG/ML
50 INJECTION, SOLUTION INTRAMUSCULAR; INTRAVENOUS
Start: 2025-07-10

## 2025-06-26 RX ORDER — METHYLPREDNISOLONE SODIUM SUCCINATE 40 MG/ML
40 INJECTION INTRAMUSCULAR; INTRAVENOUS
Start: 2025-07-10

## 2025-06-26 NOTE — PROGRESS NOTES
Anemia Management Note - Follow Up      SUBJECTIVE/OBJECTIVE:    Referred by Dr. Nathan Blood on 2024  Primary Diagnosis: Anemia in Chronic Kidney Disease (N18.4, D63.1)     Secondary Diagnosis:  Chronic Kidney Disease, Stage 4 (N18.4)   Hgb goal range:  10-11 *range increased on 24 per Dr. Blood     Epo/Darbo: Aranesp 60mcg every 14 days for Hgb <11.0. In Clinic.   Iron regimen:  Treat with IV Iron as needed.     *ok to stop oral Iron.  No improvement in Iron levels with Oral Iron.   *completed Injectafer on 24.      Labs : 2025  RX/TX plans : 2026    *staying in [place].      No history of stroke, MI, and blood clots or cancers.  Recent BRANDON use, transfusion, IV iron: NA     Contact:            No Consent to Communicate on File.        Latest Ref Rng & Units 2025 2025 2025 5/15/2025 2025 2025 2025   Anemia   HGB Goal     10 - 11      BRANDON Dose     60 mcg      Hemoglobin 11.7 - 15.7 g/dL 11.1  11.0  11.1  10.8  11.6  11.1  11.8    TSAT 15 - 46 % 36  36      39    Ferritin 11 - 328 ng/mL 352  336      329      BP Readings from Last 3 Encounters:   05/15/25 133/82   25 123/78   25 105/65     Wt Readings from Last 2 Encounters:   05/15/25 84.6 kg (186 lb 9.6 oz)   25 85.4 kg (188 lb 4.8 oz)           ASSESSMENT:    Hgb:Above goal - recommend hold dose  TSat: at goal >30% Ferritin: At goal (>100ng/mL)        PLAN:  Hold Aranesp.  RTC for hgb then Aranesp if needed in 2-4 week(s). Aranesp is scheduled for 7/10 & .     Orders needed to be renewed (for next follow-up date) in EPIC: None    Iron labs due:  End of 2025    Plan discussed with:  No call, chart review       NEXT FOLLOW-UP DATE:  25    Archana Salamanca RN   Anemia Services  [facility]  [email or web address]   Office : [phone]  Fax: [phone]

## 2025-07-10 ENCOUNTER — INFUSION THERAPY VISIT (OUTPATIENT)
Dept: INFUSION THERAPY | Facility: CLINIC | Age: 60
End: 2025-07-10
Attending: INTERNAL MEDICINE
Payer: MEDICARE

## 2025-07-10 DIAGNOSIS — N18.4 ANEMIA OF CHRONIC RENAL FAILURE, STAGE 4 (SEVERE) (H): ICD-10-CM

## 2025-07-10 DIAGNOSIS — D63.1 ANEMIA OF CHRONIC RENAL FAILURE, STAGE 4 (SEVERE) (H): ICD-10-CM

## 2025-07-10 DIAGNOSIS — N18.4 CKD (CHRONIC KIDNEY DISEASE) STAGE 4, GFR 15-29 ML/MIN (H): ICD-10-CM

## 2025-07-10 LAB
HCT VFR BLD AUTO: 34.3 % (ref 35–47)
HGB BLD-MCNC: 11.1 G/DL (ref 11.7–15.7)
HOLD SPECIMEN: NORMAL
HOLD SPECIMEN: NORMAL
MCV RBC AUTO: 91 FL (ref 78–100)

## 2025-07-10 PROCEDURE — 85018 HEMOGLOBIN: CPT

## 2025-07-10 NOTE — PROGRESS NOTES
Infusion Nursing Note:  Evelyn MCGOVERN Amelia presents today for Aranesp.    Patient seen by provider today: No   present during visit today: Not Applicable.    Note: N/A.      Intravenous Access:  No Intravenous access/labs at this visit.    Treatment Conditions:  Lab Results   Component Value Date    HGB 11.1 (L) 07/10/2025    WBC 7.3 03/06/2025    ANEU 2.9 01/14/2016     03/06/2025        Results reviewed, labs did NOT meet treatment parameters: Parameters less than 11,patient did not receive injection .      Post Infusion Assessment:  Patient tolerated injection without incident.  Site patent and intact, free from redness, edema or discomfort.       Discharge Plan:   Patient discharged in stable condition accompanied by: N/A  Departure Mode: N/A      Samantha Tobar MA

## 2025-07-24 ENCOUNTER — INFUSION THERAPY VISIT (OUTPATIENT)
Dept: INFUSION THERAPY | Facility: CLINIC | Age: 60
End: 2025-07-24
Attending: INTERNAL MEDICINE
Payer: MEDICARE

## 2025-07-24 DIAGNOSIS — D63.1 ANEMIA OF CHRONIC RENAL FAILURE, STAGE 4 (SEVERE) (H): Primary | ICD-10-CM

## 2025-07-24 DIAGNOSIS — N18.4 ANEMIA OF CHRONIC RENAL FAILURE, STAGE 4 (SEVERE) (H): Primary | ICD-10-CM

## 2025-07-24 LAB
HCT VFR BLD AUTO: 33.5 % (ref 35–47)
HGB BLD-MCNC: 11 G/DL (ref 11.7–15.7)
HOLD SPECIMEN: NORMAL
MCV RBC AUTO: 90 FL (ref 78–100)
MCV RBC AUTO: 90 FL (ref 78–100)

## 2025-07-24 PROCEDURE — 85018 HEMOGLOBIN: CPT | Performed by: INTERNAL MEDICINE

## 2025-07-24 PROCEDURE — 85014 HEMATOCRIT: CPT | Performed by: INTERNAL MEDICINE

## 2025-07-24 RX ORDER — DIPHENHYDRAMINE HYDROCHLORIDE 50 MG/ML
50 INJECTION, SOLUTION INTRAMUSCULAR; INTRAVENOUS
Start: 2025-08-07

## 2025-07-24 RX ORDER — EPINEPHRINE 1 MG/ML
0.3 INJECTION, SOLUTION INTRAMUSCULAR; SUBCUTANEOUS EVERY 5 MIN PRN
OUTPATIENT
Start: 2025-08-07

## 2025-07-24 RX ORDER — ALBUTEROL SULFATE 0.83 MG/ML
2.5 SOLUTION RESPIRATORY (INHALATION)
OUTPATIENT
Start: 2025-08-07

## 2025-07-24 RX ORDER — ALBUTEROL SULFATE 90 UG/1
1-2 INHALANT RESPIRATORY (INHALATION)
Start: 2025-08-07

## 2025-07-24 RX ORDER — METHYLPREDNISOLONE SODIUM SUCCINATE 40 MG/ML
40 INJECTION INTRAMUSCULAR; INTRAVENOUS
Start: 2025-08-07

## 2025-07-24 RX ORDER — DIPHENHYDRAMINE HYDROCHLORIDE 50 MG/ML
25 INJECTION, SOLUTION INTRAMUSCULAR; INTRAVENOUS
Start: 2025-08-07

## 2025-07-24 RX ORDER — MEPERIDINE HYDROCHLORIDE 25 MG/ML
25 INJECTION INTRAMUSCULAR; INTRAVENOUS; SUBCUTANEOUS
OUTPATIENT
Start: 2025-08-07

## 2025-07-24 NOTE — PROGRESS NOTES
Infusion Nursing Note:  Evelyn MCGOVERN Amelia presents today for Aranesp- NOT NEEDED.    Patient seen by provider today: No   present during visit today: Not Applicable.    Note: N/A.      Intravenous Access:  No Intravenous access/labs at this visit.    Treatment Conditions:  Lab Results   Component Value Date    HGB 11.0 (L) 07/24/2025    WBC 7.3 03/06/2025    ANEU 2.9 01/14/2016     03/06/2025        Results reviewed, labs did NOT meet treatment parameters: Injection not given, HGB was 11.      Post Infusion Assessment:  N/A.       Discharge Plan:   N/A.      Sharon Galeano MA   No

## 2025-08-07 ENCOUNTER — LAB (OUTPATIENT)
Dept: INFUSION THERAPY | Facility: CLINIC | Age: 60
End: 2025-08-07
Attending: INTERNAL MEDICINE
Payer: MEDICARE

## 2025-08-07 DIAGNOSIS — D63.1 ANEMIA OF CHRONIC RENAL FAILURE, STAGE 4 (SEVERE) (H): Primary | ICD-10-CM

## 2025-08-07 DIAGNOSIS — D63.1 ANEMIA OF CHRONIC RENAL FAILURE, STAGE 4 (SEVERE) (H): ICD-10-CM

## 2025-08-07 DIAGNOSIS — N18.4 ANEMIA OF CHRONIC RENAL FAILURE, STAGE 4 (SEVERE) (H): Primary | ICD-10-CM

## 2025-08-07 DIAGNOSIS — N18.4 ANEMIA OF CHRONIC RENAL FAILURE, STAGE 4 (SEVERE) (H): ICD-10-CM

## 2025-08-07 LAB
HCT VFR BLD AUTO: 34.2 % (ref 35–47)
HGB BLD-MCNC: 11.1 G/DL (ref 11.7–15.7)
HOLD SPECIMEN: NORMAL
HOLD SPECIMEN: NORMAL
MCV RBC AUTO: 90 FL (ref 78–100)
MCV RBC AUTO: 90 FL (ref 78–100)

## 2025-08-07 PROCEDURE — 85018 HEMOGLOBIN: CPT | Performed by: INTERNAL MEDICINE

## 2025-08-07 PROCEDURE — 85014 HEMATOCRIT: CPT | Performed by: INTERNAL MEDICINE

## 2025-08-07 RX ORDER — ALBUTEROL SULFATE 0.83 MG/ML
2.5 SOLUTION RESPIRATORY (INHALATION)
OUTPATIENT
Start: 2025-08-21

## 2025-08-07 RX ORDER — METHYLPREDNISOLONE SODIUM SUCCINATE 40 MG/ML
40 INJECTION INTRAMUSCULAR; INTRAVENOUS
Start: 2025-08-21

## 2025-08-07 RX ORDER — DIPHENHYDRAMINE HYDROCHLORIDE 50 MG/ML
25 INJECTION, SOLUTION INTRAMUSCULAR; INTRAVENOUS
Start: 2025-08-21

## 2025-08-07 RX ORDER — MEPERIDINE HYDROCHLORIDE 25 MG/ML
25 INJECTION INTRAMUSCULAR; INTRAVENOUS; SUBCUTANEOUS
OUTPATIENT
Start: 2025-08-21

## 2025-08-07 RX ORDER — ALBUTEROL SULFATE 90 UG/1
1-2 INHALANT RESPIRATORY (INHALATION)
Start: 2025-08-21

## 2025-08-07 RX ORDER — DIPHENHYDRAMINE HYDROCHLORIDE 50 MG/ML
50 INJECTION, SOLUTION INTRAMUSCULAR; INTRAVENOUS
Start: 2025-08-21

## 2025-08-07 RX ORDER — EPINEPHRINE 1 MG/ML
0.3 INJECTION, SOLUTION INTRAMUSCULAR; SUBCUTANEOUS EVERY 5 MIN PRN
OUTPATIENT
Start: 2025-08-21

## 2025-08-21 ENCOUNTER — INFUSION THERAPY VISIT (OUTPATIENT)
Dept: INFUSION THERAPY | Facility: CLINIC | Age: 60
End: 2025-08-21
Attending: INTERNAL MEDICINE
Payer: MEDICARE

## 2025-08-21 ENCOUNTER — PATIENT OUTREACH (OUTPATIENT)
Dept: CARE COORDINATION | Facility: CLINIC | Age: 60
End: 2025-08-21

## 2025-08-21 DIAGNOSIS — D63.1 ANEMIA OF CHRONIC RENAL FAILURE, STAGE 4 (SEVERE) (H): ICD-10-CM

## 2025-08-21 DIAGNOSIS — N18.4 CKD (CHRONIC KIDNEY DISEASE) STAGE 4, GFR 15-29 ML/MIN (H): ICD-10-CM

## 2025-08-21 DIAGNOSIS — N18.4 ANEMIA OF CHRONIC RENAL FAILURE, STAGE 4 (SEVERE) (H): ICD-10-CM

## 2025-08-21 LAB
FERRITIN SERPL-MCNC: 460 NG/ML (ref 11–328)
HCT VFR BLD AUTO: 35.5 % (ref 35–47)
HGB BLD-MCNC: 11.7 G/DL (ref 11.7–15.7)
HOLD SPECIMEN: NORMAL
IRON BINDING CAPACITY (ROCHE): 180 UG/DL (ref 240–430)
IRON SATN MFR SERPL: 44 % (ref 15–46)
IRON SERPL-MCNC: 79 UG/DL (ref 37–145)
MCV RBC AUTO: 88.8 FL (ref 78–100)

## 2025-08-21 PROCEDURE — 82728 ASSAY OF FERRITIN: CPT

## 2025-08-21 PROCEDURE — 85014 HEMATOCRIT: CPT

## 2025-08-21 PROCEDURE — 83550 IRON BINDING TEST: CPT

## 2025-08-29 ENCOUNTER — DOCUMENTATION ONLY (OUTPATIENT)
Dept: NEPHROLOGY | Facility: CLINIC | Age: 60
End: 2025-08-29
Payer: MEDICARE

## 2025-08-29 DIAGNOSIS — N18.4 CKD STAGE 4 SECONDARY TO HYPERTENSION (H): Primary | ICD-10-CM

## 2025-08-29 DIAGNOSIS — I12.9 CKD STAGE 4 SECONDARY TO HYPERTENSION (H): Primary | ICD-10-CM
